# Patient Record
Sex: FEMALE | Race: BLACK OR AFRICAN AMERICAN | Employment: OTHER | URBAN - METROPOLITAN AREA
[De-identification: names, ages, dates, MRNs, and addresses within clinical notes are randomized per-mention and may not be internally consistent; named-entity substitution may affect disease eponyms.]

---

## 2017-12-13 ENCOUNTER — HOSPITAL ENCOUNTER (OUTPATIENT)
Dept: GENERAL RADIOLOGY | Age: 67
Discharge: HOME | End: 2017-12-13
Payer: MEDICARE

## 2017-12-13 DIAGNOSIS — M06.09 RHEUMATOID ARTHRITIS OF MULTIPLE SITES WITH NEGATIVE RHEUMATOID FACTOR (HCC): ICD-10-CM

## 2017-12-13 PROCEDURE — 73120 X-RAY EXAM OF HAND: CPT

## 2018-02-02 ENCOUNTER — APPOINTMENT (OUTPATIENT)
Dept: PHYSICAL THERAPY | Age: 68
End: 2018-02-02

## 2018-02-06 ENCOUNTER — APPOINTMENT (OUTPATIENT)
Dept: PHYSICAL THERAPY | Age: 68
End: 2018-02-06

## 2018-08-22 ENCOUNTER — HOSPITAL ENCOUNTER (OUTPATIENT)
Age: 68
Discharge: HOME | End: 2018-08-22
Attending: INTERNAL MEDICINE
Payer: MEDICARE

## 2018-08-22 DIAGNOSIS — M25.512 LEFT SHOULDER PAIN: ICD-10-CM

## 2018-08-22 PROCEDURE — 73221 MRI JOINT UPR EXTREM W/O DYE: CPT

## 2018-11-08 ENCOUNTER — OFFICE VISIT (OUTPATIENT)
Dept: ORTHOPEDIC SURGERY | Age: 68
End: 2018-11-08

## 2018-11-08 VITALS
DIASTOLIC BLOOD PRESSURE: 75 MMHG | RESPIRATION RATE: 16 BRPM | TEMPERATURE: 97.4 F | HEART RATE: 73 BPM | OXYGEN SATURATION: 99 % | WEIGHT: 266 LBS | SYSTOLIC BLOOD PRESSURE: 136 MMHG | BODY MASS INDEX: 41.75 KG/M2 | HEIGHT: 67 IN

## 2018-11-08 DIAGNOSIS — M75.112 INCOMPLETE TEAR OF LEFT ROTATOR CUFF: Primary | ICD-10-CM

## 2018-11-08 DIAGNOSIS — R22.30 SHOULDER MASS: ICD-10-CM

## 2018-11-08 PROBLEM — E66.01 OBESITY, MORBID (HCC): Status: ACTIVE | Noted: 2018-11-08

## 2018-11-08 RX ORDER — TRIAMCINOLONE ACETONIDE 40 MG/ML
40 INJECTION, SUSPENSION INTRA-ARTICULAR; INTRAMUSCULAR ONCE
Qty: 1 ML | Refills: 0
Start: 2018-11-08 | End: 2018-11-08

## 2018-11-08 NOTE — PROGRESS NOTES
Arron Cortez 1950 Chief Complaint Patient presents with  Shoulder Pain  
  left shoulder pain; mostly when laying down HISTORY OF PRESENT ILLNESS Arron Cortez is a 70 y.o. female who presents today for evaluation of left shoulder pain. She rates her pain 7/10 today. Pain has been present for a few months. The patient noticed a knot in the shoulder, which causes pain at night. Patient describes the pain as aching that is Constant in nature. Symptoms are worse with reaching overhead, Activity and is better with  Heat. Associated symptoms include weakness. Since problem started, it: is unchanged. Pain does wake patient up at night. Has taken no meds for the problem. Has tried following treatments: Injections:NO; Brace:NO; Therapy:NO; Cane/Crutch:NO Allergies Allergen Reactions  Penicillins Hives  Sulfur Hives Past Medical History:  
Diagnosis Date  CAD (coronary artery disease) 2003 Cabg x2  Diabetes ([facility] [address]  GERD (gastroesophageal reflux disease)  H/O seasonal allergies  Hypercholesteremia  Hypertension  Positive PPD, treated   
 cleared by physician 2.5 years ago.  [facility] Social History Socioeconomic History  Marital status: UNKNOWN Spouse name: Not on file  Number of children: Not on file  Years of education: Not on file  Highest education level: Not on file Social Needs  Financial resource strain: Not on file  Food insecurity - worry: Not on file  Food insecurity - inability: Not on file  Transportation needs - medical: Not on file  Transportation needs - non-medical: Not on file Occupational History  Not on file Tobacco Use  Smoking status: Never Smoker  Smokeless tobacco: Never Used Substance and Sexual Activity  Alcohol use: No  
 Drug use: No  
 Sexual activity: Not on file Other Topics Concern  Not on file Social History Narrative  Not on file Past Surgical History:  
Procedure Laterality Date  ABDOMEN SURGERY PROC UNLISTED  11/2014 Gastric Sleeve  HX BREAST LUMPECTOMY Right 6/2014  
 benign  HX CORONARY ARTERY BYPASS GRAFT  2003  
 cabg x2  HX CYST REMOVAL   age 21?  
 right  HX DILATION AND CURETTAGE    
 x2  
 HX TUBAL LIGATION No family history on file. Current Outpatient Medications Medication Sig  
 triamcinolone acetonide (KENALOG) 40 mg/mL injection 1 mL by IntraMUSCular route once for 1 dose.  atenolol (TENORMIN) 100 mg tablet Take 100 mg by mouth daily.  valsartan-hydrochlorothiazide (DIOVAN-HCT) 320-12.5 mg per tablet Take 1 Tab by mouth daily.  rosuvastatin (CRESTOR) 20 mg tablet Take 20 mg by mouth nightly.  insulin lispro protamine/insulin lispro (HUMALOG MIX 75/25) 100 unit/mL (75-25) injection by SubCUTAneous route.  ibuprofen (MOTRIN) 800 mg tablet Take 800 mg by mouth every six (6) hours as needed for Pain.  leflunomide (ARAVA) 20 mg tablet Take 20 mg by mouth daily.  aspirin 81 mg tablet Take 81 mg by mouth. No current facility-administered medications for this visit. REVIEW OF SYSTEM Patient denies: Weight loss, Fever/Chills, HA, Visual changes, Fatigue, Chest pain, SOB, Abdominal pain, N/V/D/C, Blood in stool or urine, Edema. Pertinent positive as above in HPI. All others were negative PHYSICAL EXAM:  
Visit Vitals /75 Pulse 73 Temp 97.4 °F (36.3 °C) (Oral) Resp 16 Ht 5' 7\" (1.702 m) Wt 266 lb (120.7 kg) LMP 09/15/2013 SpO2 99% BMI 41.66 kg/m² The patient is a well-developed, well-nourished female   in no acute distress. The patient is alert and oriented times three. The patient is alert and oriented times three. Mood and affect are normal. 
LYMPHATIC: lymph nodes are not enlarged and are within normal limits SKIN: normal in color and non tender to palpation. There are no bruises or abrasions noted. NEUROLOGICAL: Motor sensory exam is within normal limits. Reflexes are equal bilaterally. There is normal sensation to pinprick and light touch MUSCULOSKELETAL: 
Examination Left shoulder Skin Intact AC joint tenderness - Biceps tenderness - Forward flexion/Elevation  Active abduction  Glenohumeral abduction 90 External rotation ROM 45 Internal rotation ROM 30 Apprehension -  
Gagandeeps Relocation -  
Jerk - Load and Shift -  
Ancelmo Just - Speeds - Impingement sign + Supraspinatus/Empty Can -, 5/5 External Rotation Strength -, 5/5 Lift Off/Belly Press -, 5/5 Neurovascular Intact 3 by 3 cm mass over proximal arm, non tender to palpation PROCEDURE: Left Shoulder Injection with Ultrasound Guidance Indication:Left Shoulder pain/swelling After sterile prep, 6 cc of Xylocaine and 1 cc of Kenalog were injected into the left shoulder. Ultrasound images captured using Pramana1 [employer] Ultrasound machine and scanned into patient's chart. [facility] PROCEDURE PROGRESS NOTE Chart reviewed for the following: 
Diana Van M.D, have reviewed the History, Physical and updated the Allergic reactions for [facility] TIME OUT performed immediately prior to start of procedure: 
I, Heriberto Bill M.D, have performed the following reviews on [facility] prior to the start of the procedure: 
         
* Patient was identified by name and date of birth * Agreement on procedure being performed was verified * Risks and Benefits explained to the patient * Procedure site verified and marked as necessary * Patient was positioned for comfort * Consent was signed and verified Time: 10:32 AM  
 
Date of procedure: 11/8/2018 Procedure performed by:  Heriberto Bill M.D Provider assisted by: (see medication administration) How tolerated by patient: tolerated the procedure well with no complications Comments: none IMAGING: MRI of left shoulder dated 8/22/18 was reviewed and read: IMPRESSION: 
1. Supraspinatus and infraspinatus tendinopathy with partial-thickness tears as detailed above including a near full-thickness perforation at the supraspinatus insertion. 
-Mild acromioclavicular degenerative change with type II acromion. Small amount of subacromial subdeltoid bursal fluid. 2. Subscapularis insertion tendinopathy without definite tear. 
-Intra-articular biceps tendinopathy. 3. Suspected mild degenerative blunting of the posterior labrum. 
-Minimal edema in the rotator interval with fat signal largely preserved, 
nonspecific. Early adhesive capsulitis may be considered in the appropriate setting. IMPRESSION:   
  ICD-10-CM ICD-9-CM 1. Incomplete tear of left rotator cuff M75.112 840.4 TRIAMCINOLONE ACETONIDE INJ  
   triamcinolone acetonide (KENALOG) 40 mg/mL injection US GUIDE INJ/ASP/ARTHRO LG JNT/BURSA 2. Shoulder mass R22.30 719.61 PLAN: 
1. The patient presents today with left shoulder pain and I would like to see what impact a cortisone injection will have on the pain. Risk factors include: htn 2. No ultrasound exam indicated today 3. Yes cortisone injection indicated today L SHOULDER US 
4. No Physical/Occupational Therapy indicated today 5. No diagnostic test indicated today: 6. No durable medical equipment indicated today 7. No referral indicated today 8. No medications indicated today: 9. No Narcotic indicated today RTC 3 weeks Follow-up Disposition: Not on File Scribed by Nery Meyer ([address]) as dictated by Fam Fernando MD 
 
I, Dr. Fam Fernando, confirm that all documentation is accurate.  
 
Fam Fernando M.D.  
[facility]

## 2018-12-04 ENCOUNTER — OFFICE VISIT (OUTPATIENT)
Dept: ORTHOPEDIC SURGERY | Age: 68
End: 2018-12-04

## 2018-12-04 VITALS
SYSTOLIC BLOOD PRESSURE: 161 MMHG | HEIGHT: 67 IN | OXYGEN SATURATION: 99 % | HEART RATE: 80 BPM | WEIGHT: 264.2 LBS | RESPIRATION RATE: 16 BRPM | DIASTOLIC BLOOD PRESSURE: 78 MMHG | BODY MASS INDEX: 41.47 KG/M2 | TEMPERATURE: 97 F

## 2018-12-04 DIAGNOSIS — M75.112 INCOMPLETE TEAR OF LEFT ROTATOR CUFF: Primary | ICD-10-CM

## 2018-12-04 DIAGNOSIS — R22.30 SHOULDER MASS: ICD-10-CM

## 2018-12-04 NOTE — PROGRESS NOTES
Eduard Rios 1950 Chief Complaint Patient presents with  Shoulder Pain LEFT SHOULDER PAIN  
  
 
HISTORY OF PRESENT ILLNESS Eduard Rios is a 70 y.o. female who presents today for reevaluation of left shoulder pain. Patient rates pain as 0/10 today. At last OV, patient had a cortisone injection which provided good relief. She can  her arm without pain. She notes her glucose levels zhane after the injection, which she was not happy with. Pain has been present for a few months. The patient noticed a knot in the shoulder, which causes pain at night. Patient denies any fever, chills, chest pain, shortness of breath or calf pain. The remainder of the review of systems is negative. There are no new illness or injuries to report since last seen in the office. There are no changes to medications, allergies, family or social history. PHYSICAL EXAM:  
Visit Vitals /78 Pulse 80 Temp 97 °F (36.1 °C) (Oral) Resp 16 Ht 5' 7\" (1.702 m) Wt 264 lb 3.2 oz (119.8 kg) LMP 09/15/2013 SpO2 99% BMI 41.38 kg/m² The patient is a well-developed, well-nourished female   in no acute distress. The patient is alert and oriented times three. The patient is alert and oriented times three. Mood and affect are normal. 
LYMPHATIC: lymph nodes are not enlarged and are within normal limits SKIN: normal in color and non tender to palpation. There are no bruises or abrasions noted. NEUROLOGICAL: Motor sensory exam is within normal limits. Reflexes are equal bilaterally. There is normal sensation to pinprick and light touch MUSCULOSKELETAL: 
Examination Left shoulder Skin Intact AC joint tenderness - Biceps tenderness - Forward flexion/Elevation  Active abduction  Glenohumeral abduction 90 External rotation ROM 45 Internal rotation ROM 30 Apprehension -  
Gagandeeps Relocation -  
Jerk - Load and Shift -  
Jaelyn Leyva - Clarissa - Impingement sign + Supraspinatus/Empty Can -, 5/5 External Rotation Strength -, 5/5 Lift Off/Belly Press -, 5/5 Neurovascular Intact 3 by 3 cm mass over proximal arm, non tender to palpation IMAGING: MRI of left shoulder dated 8/22/18 was reviewed and read: IMPRESSION: 
1. Supraspinatus and infraspinatus tendinopathy with partial-thickness tears as detailed above including a near full-thickness perforation at the supraspinatus insertion. 
-Mild acromioclavicular degenerative change with type II acromion. Small amount of subacromial subdeltoid bursal fluid. 2. Subscapularis insertion tendinopathy without definite tear. 
-Intra-articular biceps tendinopathy. 3. Suspected mild degenerative blunting of the posterior labrum. 
-Minimal edema in the rotator interval with fat signal largely preserved, 
nonspecific. Early adhesive capsulitis may be considered in the appropriate setting. 
  
IMPRESSION:   
  ICD-10-CM ICD-9-CM 1. Incomplete tear of left rotator cuff M75.112 840.4 2. Shoulder mass R22.30 719.61 PLAN:  
1. The patient presents today with left shoulder pain due to a partial rotator cuff tear which was helped greatly by the cortisone injection. Risk factors include: htn 2. No ultrasound exam indicated today 3. No cortisone injection indicated today 4. No Physical/Occupational Therapy indicated today 5. No diagnostic test indicated today: 6. No durable medical equipment indicated today 7. No referral indicated today 8. No medications indicated today: 9. No Narcotic indicated today RTC prn Follow-up Disposition: Not on File Scribed by Ilene Dey ([place]nes) as dictated by Lorna Mcclendon MD 
 
I, Dr. Lorna Mcclendon, confirm that all documentation is accurate.  
 
Lorna Mcclendon M.D.  
[facility]

## 2019-02-27 ENCOUNTER — HOSPITAL ENCOUNTER (OUTPATIENT)
Dept: GENERAL RADIOLOGY | Age: 69
Discharge: HOME | End: 2019-02-27
Admitting: INTERNAL MEDICINE
Payer: MEDICARE

## 2019-02-27 DIAGNOSIS — R05.9 COUGH: ICD-10-CM

## 2019-02-27 PROCEDURE — 71046 X-RAY EXAM CHEST 2 VIEWS: CPT

## 2021-05-06 ENCOUNTER — OFFICE VISIT (OUTPATIENT)
Dept: ORTHOPEDIC SURGERY | Age: 71
End: 2021-05-06
Payer: MEDICARE

## 2021-05-06 VITALS
BODY MASS INDEX: 43 KG/M2 | HEIGHT: 67 IN | WEIGHT: 274 LBS | HEART RATE: 66 BPM | TEMPERATURE: 98.1 F | OXYGEN SATURATION: 99 %

## 2021-05-06 DIAGNOSIS — M54.50 CHRONIC LOW BACK PAIN, UNSPECIFIED BACK PAIN LATERALITY, UNSPECIFIED WHETHER SCIATICA PRESENT: ICD-10-CM

## 2021-05-06 DIAGNOSIS — G89.29 CHRONIC LOW BACK PAIN, UNSPECIFIED BACK PAIN LATERALITY, UNSPECIFIED WHETHER SCIATICA PRESENT: ICD-10-CM

## 2021-05-06 DIAGNOSIS — M16.11 PRIMARY OSTEOARTHRITIS OF RIGHT HIP: ICD-10-CM

## 2021-05-06 DIAGNOSIS — M43.19 SPONDYLOLISTHESIS OF MULTIPLE SITES IN SPINE: Primary | ICD-10-CM

## 2021-05-06 DIAGNOSIS — M43.19 SPONDYLOLISTHESIS OF MULTIPLE SITES IN SPINE: ICD-10-CM

## 2021-05-06 PROCEDURE — 99204 OFFICE O/P NEW MOD 45 MIN: CPT | Performed by: PHYSICAL MEDICINE & REHABILITATION

## 2021-05-06 PROCEDURE — G8400 PT W/DXA NO RESULTS DOC: HCPCS | Performed by: PHYSICAL MEDICINE & REHABILITATION

## 2021-05-06 PROCEDURE — G8427 DOCREV CUR MEDS BY ELIG CLIN: HCPCS | Performed by: PHYSICAL MEDICINE & REHABILITATION

## 2021-05-06 PROCEDURE — 1101F PT FALLS ASSESS-DOCD LE1/YR: CPT | Performed by: PHYSICAL MEDICINE & REHABILITATION

## 2021-05-06 PROCEDURE — 1090F PRES/ABSN URINE INCON ASSESS: CPT | Performed by: PHYSICAL MEDICINE & REHABILITATION

## 2021-05-06 PROCEDURE — G8536 NO DOC ELDER MAL SCRN: HCPCS | Performed by: PHYSICAL MEDICINE & REHABILITATION

## 2021-05-06 PROCEDURE — 72110 X-RAY EXAM L-2 SPINE 4/>VWS: CPT | Performed by: PHYSICAL MEDICINE & REHABILITATION

## 2021-05-06 PROCEDURE — G8510 SCR DEP NEG, NO PLAN REQD: HCPCS | Performed by: PHYSICAL MEDICINE & REHABILITATION

## 2021-05-06 PROCEDURE — G8417 CALC BMI ABV UP PARAM F/U: HCPCS | Performed by: PHYSICAL MEDICINE & REHABILITATION

## 2021-05-06 PROCEDURE — 3017F COLORECTAL CA SCREEN DOC REV: CPT | Performed by: PHYSICAL MEDICINE & REHABILITATION

## 2021-05-06 RX ORDER — DULOXETIN HYDROCHLORIDE 60 MG/1
60 CAPSULE, DELAYED RELEASE ORAL
Qty: 30 CAP | Refills: 1 | Status: SHIPPED | OUTPATIENT
Start: 2021-05-06 | End: 2021-06-14 | Stop reason: SDUPTHER

## 2021-05-06 RX ORDER — FLUTICASONE PROPIONATE 50 MCG
2 SPRAY, SUSPENSION (ML) NASAL
COMMUNITY
Start: 2020-10-14

## 2021-05-06 RX ORDER — LOSARTAN POTASSIUM AND HYDROCHLOROTHIAZIDE 12.5; 1 MG/1; MG/1
1 TABLET ORAL DAILY
COMMUNITY
End: 2021-05-06

## 2021-05-06 RX ORDER — HYDROXYCHLOROQUINE SULFATE 200 MG/1
200 TABLET, FILM COATED ORAL 2 TIMES DAILY
COMMUNITY

## 2021-05-06 RX ORDER — NAPROXEN SODIUM 220 MG
TABLET ORAL
COMMUNITY

## 2021-05-06 RX ORDER — PEN NEEDLE, DIABETIC 31 GX3/16"
NEEDLE, DISPOSABLE MISCELLANEOUS
COMMUNITY
Start: 2021-02-17

## 2021-05-06 RX ORDER — METAXALONE 800 MG/1
800 TABLET ORAL
COMMUNITY
Start: 2021-04-16

## 2021-05-06 RX ORDER — DULOXETIN HYDROCHLORIDE 30 MG/1
CAPSULE, DELAYED RELEASE ORAL
COMMUNITY
Start: 2021-02-08 | End: 2021-05-06 | Stop reason: SDUPTHER

## 2021-05-06 RX ORDER — METOPROLOL SUCCINATE 50 MG/1
TABLET, EXTENDED RELEASE ORAL
COMMUNITY
Start: 2021-02-08

## 2021-05-06 RX ORDER — ALBUTEROL SULFATE 90 UG/1
2 AEROSOL, METERED RESPIRATORY (INHALATION)
COMMUNITY
Start: 2020-06-15

## 2021-05-06 RX ORDER — PEN NEEDLE, DIABETIC 31 GX5/16"
NEEDLE, DISPOSABLE MISCELLANEOUS
COMMUNITY
Start: 2021-02-18

## 2021-05-06 NOTE — PROGRESS NOTES
Verbal order entered per Dr. Kendy Galvez as documented on blue sheet: MRI lumbar spine-4 months low back pain, night pain, spondylolisthesis

## 2021-05-06 NOTE — PROGRESS NOTES
[address] 
[address], [address] [place], [address] [place] [address] Street Phone: [phone] Fax: [phone] Terry Burrell : 1950 PCP: Daja Stewart MD 
 
 
NEW PATIENT 
 
 
ASSESSMENT AND PLAN Diagnoses and all orders for this visit: 1. Spondylolisthesis of multiple sites in spine 
-     DULoxetine (CYMBALTA) 60 mg capsule; Take 1 Cap by mouth daily (with dinner). 2. Chronic low back pain, unspecified back pain laterality, unspecified whether sciatica present -     AMB POC XRAY, SPINE, LUMBOSACRAL; 4+ 1. Perry Hodgkins is a 69 y.o. female ***. 2. Advised to stay active as tolerated. 3. Recommended f/u with Dr. Carolee Stone for L hip pain 4. Increased Cymbalta to 60 mg qdinner 5. L MRI: *** 
6. Given information on MRI Follow-up and Dispositions · Return for MRI/CT f/u. CHIEF COMPLAINT Perry Hodgkins is seen today in consultation at the request of Dr. Yordan Hu for complaints of back pain HISTORY OF PRESENT ILLNESS Perry Hodgkins is a 69 y.o. female. Today pt c/o back pain of 20 years duration. Pt denies any specific incident or injury that caused their pain Pt states that she used to have intermittent back pain. However, she affirms daily back pain for the last 4 months. Pt reports that her biggest pain is when she tries to lay down. Describes as a soreness to the bone in the LLE. Denies feeling numbness or heaviness in the BLE. She affirms increased low back pain with walking. Positive shopping cart. Pt also states that she has difficulty climbing in and out of cars. Pain Assessment  2021 Location of Pain Back Location Modifiers - Severity of Pain 8 Quality of Pain Throbbing Duration of Pain Persistent Frequency of Pain Constant Aggravating Factors Walking; Other (Comment) Aggravating Factors Comment laying down , standing up Limiting Behavior Yes Relieving Factors Other (Comment) Relieving Factors Comment hot shower sometimes Result of Injury No  
 
 
Onset of pain: ~2001, worse since 1/2021 Does pain radiate into extremities: L hip Does patient have numbness/tingling: no 
Does patient have weakness: no  
Denies red flags including: persistent fevers, chills, weight changes, saddle paresthesias, and neurogenic bowel or bladder symptoms. Treatments patient has tried: 
Physical therapy:No 
Doing HEP: No 
Beneficial medications: Ibuprofen 800 mg. Cymbalta 30 mg. Failed medications: Skelaxin Spinal injections: No 
 
Spinal surgery- No.  
Spine surgery consult: No.  
 
L XR AP/lat/flex/ext 4V 5/6/2021 (I personally reviewed these images): lumbar listhesis L4-5-S1, mild motion***  reviewed. PMHx of GERD, DM, HLD, CAD, RA. 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 Raising great grandson. PAST MEDICAL HISTORY Past Medical History:  
Diagnosis Date  CAD (coronary artery disease) 2003 Cabg x2  Diabetes ([address]  GERD (gastroesophageal reflux disease)  H/O seasonal allergies  Hypercholesteremia  Hypertension  Positive PPD, treated   
 cleared by physician 2.5 years ago.  Rheumatoid arthritis ([facility] [address] Past Surgical History:  
Procedure Laterality Date  COLONOSCOPY N/A 7/12/2016 COLONOSCOPY performed by Darren Pinto MD at [facility] ENDOSCOPY  
 HX BREAST LUMPECTOMY Right 6/2014  
 benign  HX CORONARY ARTERY BYPASS GRAFT  2003  
 cabg x2  HX CYST REMOVAL   age 21?  
 right  HX DILATION AND CURETTAGE    
 x2  
 HX TUBAL LIGATION    
 OR ABDOMEN SURGERY PROC UNLISTED  11/2014 Gastric Sleeve MEDICATIONS Current Outpatient Medications Medication Sig Dispense Refill  albuterol (PROVENTIL HFA, VENTOLIN HFA, PROAIR HFA) 90 mcg/actuation inhaler Take 2 Puffs by inhalation every six (6) hours as needed.     
 fluticasone propionate (FLONASE) 50 mcg/actuation nasal spray 1 Spray two (2) times a day.    
 hydrOXYchloroQUINE (PLAQUENIL) 200 mg tablet Take 200 mg by mouth two (2) times a day.  metaxalone (SKELAXIN) 800 mg tablet take 1/2 to 1 tablet up to three times a day for 90 DAYS  metoprolol succinate (TOPROL-XL) 50 mg XL tablet take 1 tablet by mouth once daily  BD Ultra-Fine Short Pen Needle 31 gauge x 5/16\" ndle use 1 PEN NEEDLE to inject MEDICATION subcutaneously twice a day  Insulin Needles, Disposable, 31 gauge x 3/16\" ndle USE 1 PEN NEEDLE TWICE A DAY TO INJECT INSULIN AS DIRECTED  Insulin Syringe-Needle U-100 0.5 mL 31 gauge x 5/16\" syrg by Misc. (Non-Drug; Combo Route) route.  DULoxetine (CYMBALTA) 60 mg capsule Take 1 Cap by mouth daily (with dinner). 30 Cap 1  valsartan-hydrochlorothiazide (DIOVAN-HCT) 320-12.5 mg per tablet Take 1 Tab by mouth daily.  rosuvastatin (CRESTOR) 20 mg tablet Take 20 mg by mouth nightly.  insulin lispro protamine/insulin lispro (HUMALOG MIX 75/25) 100 unit/mL (75-25) injection by SubCUTAneous route. 30 units in the morning  ibuprofen (MOTRIN) 800 mg tablet Take 800 mg by mouth every six (6) hours as needed for Pain.  aspirin 81 mg tablet Take 81 mg by mouth daily. Controlled Substance Monitoring: No flowsheet data found. ALLERGIES Allergies Allergen Reactions  Penicillins Hives  Sulfur Hives PHYSICAL EXAMINATION Visit Vitals Pulse 66 Temp 98.1 °F (36.7 °C) (Oral) Ht 5' 7\" (1.702 m) Wt 274 lb (124.3 kg) LMP 09/15/2013 SpO2 99% Comment: RA  
BMI 42.91 kg/m² SPINE/MUSCULOSKELETAL EXAM 
 
Unable to stand at neutral.  
 
 
Written by [employer] Lloyd, as dictated by Trey Gaytan MD. 
 
I, Dr. Trey Gaytan MD, confirm that all documentation is accurate. Ms. Yaya Rajput may have a reminder for a \"due or due soon\" health maintenance. I have asked that she contact her primary care provider for follow-up on this health maintenance.

## 2021-05-06 NOTE — PROGRESS NOTES
[address] [address]  [address], [address]  [place] [address]  Phone: [phone]  Fax: [phone]        Lesia Salgado  : 1950  PCP: Tunde Jimenez MD    PROGRESS NOTE      ASSESSMENT AND PLAN    Diagnoses and all orders for this visit:    1. Spondylolisthesis of multiple sites in spine  -     AMB POC XRAY, SPINE, LUMBOSACRAL; 4+  -     DULoxetine (CYMBALTA) 60 mg capsule; Take 1 Cap by mouth daily (with dinner). -     MRI LUMB SPINE WO CONT; Future         1. Ms. Sher Cornelius is a 39-year-old lady with progressive nocturnal back pain and symptoms consistent with neurogenic claudication. She has a history of of rheumatoid arthritis which is under control. She is having difficulty sleeping despite her medications and had no benefit with muscle relaxers. She has been taking duloxetine 30 mg times several months by Dr. Yajaira Bejarano instead of Silvia Dorsey. She denies any benefit for her joints. I am going to change this to 60 mg but at dinnertime. .  Given her listhesis, I think she needs an MRI for further evaluation of stenosis. 2. She is going to follow-up with Dr. Sonia Taylor for her hip pain. She has seen him in the past    Follow-up and Dispositions    · Return for MRI/CT f/u. HISTORY OF PRESENT ILLNESS  Brea Tapia is a 69 y.o. female. Pt presents to the office as a new pt for low back pain. She is referred from Dr. Yajaira Bejarano. Notes reviewed, she has a history of rheumatoid arthritis and recently had her meds adjusted due to elevated LFTs from Silvia Dorsey. She also has a history of reflux and diabetes and is therefore challenging as far as medication options. Ms. Sher Cornelius is reporting back pain for 20 years, its been intermittent. About 4 months ago she started to notice daily pain particularly at nighttime. She has not slept well in months. She denies any radiating pain but does have a lot of left hip pain.   No fevers chills bowel or bladder changes. On physical exam pleasant middle-aged lady in no acute distress. She is tender at the sacrum. Strength of her lower extremities is intact. She does have groin pain with left hip range of motion and a positive roll test.  Straight leg raise is negative. Pain Assessment  5/6/2021   Location of Pain Back   Location Modifiers -   Severity of Pain 8   Quality of Pain Throbbing   Duration of Pain Persistent   Frequency of Pain Constant   Aggravating Factors Walking; Other (Comment)   Aggravating Factors Comment laying down , standing up   Limiting Behavior Yes   Relieving Factors Other (Comment)   Relieving Factors Comment hot shower sometimes   Result of Injury No         Onset of pain: Chronic, worse since 12/ 2020, no injury  Does pain radiate into extremities: No  Does patient have numbness/tingling: No  Does patient have weakness: No  Denies red flags including: persistent fevers, chills, weight changes, saddle paresthesias, and neurogenic bowel or bladder symptoms. Treatments patient has tried:  Physical therapy:No  Doing HEP: Unknown  Beneficial medications: Ibu  Failed medications: Skelaxin  Spinal injections: No    Spinal surgery- No.   Spine surgery consult: Unknown. 4 views of the lumbar spine demonstrate a spondylolisthesis L4-L5 and L5-S1 grade 1 with slight motion on flexion  5/6/2021 (I personally reviewed these images):   C x-ray: 2013: DDD C5/C6   L MRI 2012: Transitional lumbosacral anatomy, listhesis at L4-L5 with facet arthropathy. Pertinent PMHx of CAD status post CABG, diabetes, reflux, HTN, rheumatoid arthritiss (Dr. Tiffanie Daughetry)    Visit Vitals  Pulse 66   Temp 98.1 °F (36.7 °C) (Oral)   Ht 5' 7\" (1.702 m)   Wt 274 lb (124.3 kg)   LMP 09/15/2013   SpO2 99% Comment: RA   BMI 42.91 kg/m²       Ms. Sonali Obrien may have a reminder for a \"due or due soon\" health maintenance. I have asked that she contact her primary care provider for follow-up on this health maintenance.  This note was created using Dragon transcription software and may contain unintended errors.

## 2021-05-06 NOTE — PROGRESS NOTES
Carlos Valderrama presents today for   Chief Complaint   Patient presents with    Back Pain       Is someone accompanying this pt? no    Is the patient using any DME equipment during OV? no    Depression Screening:  3 most recent PHQ Screens 5/6/2021   Little interest or pleasure in doing things Not at all   Feeling down, depressed, irritable, or hopeless Not at all   Total Score PHQ 2 0       Abuse Screening:  Abuse Screening Questionnaire 5/6/2021   Do you ever feel afraid of your partner? N   Are you in a relationship with someone who physically or mentally threatens you? N   Is it safe for you to go home? Y       Fall Risk  Fall Risk Assessment, last 12 mths 5/6/2021   Able to walk? Yes   Fall in past 12 months? 0   Do you feel unsteady? 0   Are you worried about falling 0       Coordination of Care:  1. Have you been to the ER, urgent care clinic since your last visit? no  Hospitalized since your last visit? no    2. Have you seen or consulted any other health care providers outside of the [address] [place] [address] since your last visit? Yes, rheumatology Include any pap smears or colon screening.  no

## 2021-05-06 NOTE — PATIENT INSTRUCTIONS
Magnetic Resonance Imaging (MRI): About This Test 
What is it? Magnetic resonance imaging (MRI) is a test that uses a magnetic field and pulses of radio wave energy to make pictures of organs and structures inside the body. When you have an MRI, you lie on a table and your body is moved into the MRI machine, where an image is taken of the area of the body being studied. Why is this test done? There are many reasons to have an MRI test. This test can find problems such as tumors, bleeding, injury, conditions that some children are born with, and infection. An MRI also may provide more information about a problem seen on an X-ray, ultrasound scan, CT scan, or nuclear medicine exam. 
How can you prepare for the test? 
For some MRI pictures of the belly, you may be asked to not eat or drink for several hours before the test. 
Tell your doctor if you get nervous in tight spaces. You may get a medicine to help you relax. If you think you'll get this medicine, be sure to arrange a ride home. It may be unsafe for you to drive or get home on your own. How is the test done? · You may have contrast materials (dye) put into your arm through a tube called an IV. · You will lie on a table that is part of the MRI scanner. · The table will slide into the space that contains the magnet. · Inside the scanner you will hear a fan and feel air moving. You may hear tapping, thumping, or snapping noises. You may be given earplugs or headphones to reduce the noise. · You will be asked to hold still during the scan. You may be asked to hold your breath for short periods. · You may be alone in the scanning room. But a technologist will watch you through a window and talk with you during the test. 
What are the risks of the test? 
· Contrast material that contains gadolinium may be used in this test. But for most people, the benefit of its use in this test outweighs the risk.  Be sure to tell your doctor if you have kidney problems or are pregnant. · If you breastfeed and are concerned about whether the dye used in this test is safe, talk to your doctor. Most experts believe that very little dye passes into breast milk and even less is passed on to the baby. But if you are concerned, you can stop breastfeeding for up to 24 hours after the test. During this time, you can give your baby breast milk that you stored before the test. Don't use the breast milk you pump in the 24 hours after the test. Throw it out. How long does the test take? The test usually takes 30 to 60 minutes. But it can take as long as 2 hours. What happens after the test? 
You will probably be able to go home right away, depending on the reason for the test. 
Follow-up care is a key part of your treatment and safety. Be sure to make and go to all appointments, and call your doctor if you are having problems. It's also a good idea to keep a list of the medicines you take. Ask your doctor when you can expect to have your test results. Where can you learn more? Go to http://www.gray.com/ Enter V016 in the search box to learn more about \"Magnetic Resonance Imaging (MRI): About This Test.\" Current as of: September 23, 2020               Content Version: 12.8 © 2006-2021 [employer]. Care instructions adapted under license by [employer] (which disclaims liability or warranty for this information). If you have questions about a medical condition or this instruction, always ask your healthcare professional. [place] [address] any warranty or liability for your use of this information.

## 2021-05-26 ENCOUNTER — HOSPITAL ENCOUNTER (OUTPATIENT)
Age: 71
Discharge: HOME | End: 2021-05-26
Attending: PHYSICAL MEDICINE & REHABILITATION
Payer: MEDICARE

## 2021-05-26 PROCEDURE — 72148 MRI LUMBAR SPINE W/O DYE: CPT

## 2021-06-14 ENCOUNTER — OFFICE VISIT (OUTPATIENT)
Dept: ORTHOPEDIC SURGERY | Age: 71
End: 2021-06-14
Payer: MEDICARE

## 2021-06-14 VITALS
HEIGHT: 67 IN | HEART RATE: 59 BPM | BODY MASS INDEX: 41.91 KG/M2 | OXYGEN SATURATION: 99 % | WEIGHT: 267 LBS | TEMPERATURE: 97.7 F

## 2021-06-14 DIAGNOSIS — M47.816 LUMBAR FACET ARTHROPATHY: ICD-10-CM

## 2021-06-14 DIAGNOSIS — M16.0 PRIMARY OSTEOARTHRITIS OF BOTH HIPS: ICD-10-CM

## 2021-06-14 DIAGNOSIS — M43.16 SPONDYLOLISTHESIS AT L4-L5 LEVEL: Primary | ICD-10-CM

## 2021-06-14 PROCEDURE — 1090F PRES/ABSN URINE INCON ASSESS: CPT | Performed by: PHYSICAL MEDICINE & REHABILITATION

## 2021-06-14 PROCEDURE — 99214 OFFICE O/P EST MOD 30 MIN: CPT | Performed by: PHYSICAL MEDICINE & REHABILITATION

## 2021-06-14 PROCEDURE — G8427 DOCREV CUR MEDS BY ELIG CLIN: HCPCS | Performed by: PHYSICAL MEDICINE & REHABILITATION

## 2021-06-14 PROCEDURE — G8432 DEP SCR NOT DOC, RNG: HCPCS | Performed by: PHYSICAL MEDICINE & REHABILITATION

## 2021-06-14 PROCEDURE — 3017F COLORECTAL CA SCREEN DOC REV: CPT | Performed by: PHYSICAL MEDICINE & REHABILITATION

## 2021-06-14 PROCEDURE — G8417 CALC BMI ABV UP PARAM F/U: HCPCS | Performed by: PHYSICAL MEDICINE & REHABILITATION

## 2021-06-14 PROCEDURE — G8536 NO DOC ELDER MAL SCRN: HCPCS | Performed by: PHYSICAL MEDICINE & REHABILITATION

## 2021-06-14 PROCEDURE — 1101F PT FALLS ASSESS-DOCD LE1/YR: CPT | Performed by: PHYSICAL MEDICINE & REHABILITATION

## 2021-06-14 PROCEDURE — G8400 PT W/DXA NO RESULTS DOC: HCPCS | Performed by: PHYSICAL MEDICINE & REHABILITATION

## 2021-06-14 RX ORDER — ATENOLOL 100 MG/1
100 TABLET ORAL DAILY
COMMUNITY

## 2021-06-14 RX ORDER — METHYLPREDNISOLONE 4 MG/1
TABLET ORAL
Qty: 1 DOSE PACK | Refills: 0 | Status: SHIPPED | OUTPATIENT
Start: 2021-06-14 | End: 2021-09-27 | Stop reason: SINTOL

## 2021-06-14 RX ORDER — LOSARTAN POTASSIUM AND HYDROCHLOROTHIAZIDE 12.5; 1 MG/1; MG/1
1 TABLET ORAL DAILY
COMMUNITY
End: 2021-09-27

## 2021-06-14 RX ORDER — DULOXETIN HYDROCHLORIDE 60 MG/1
60 CAPSULE, DELAYED RELEASE ORAL
Qty: 90 CAPSULE | Refills: 1 | Status: SHIPPED | OUTPATIENT
Start: 2021-06-14 | End: 2021-09-27 | Stop reason: SINTOL

## 2021-06-14 NOTE — PROGRESS NOTES
[address]  [address], [address] [place] [address]  Phone: [phone]  Fax: [phone]      Lesia Salgado  : 1950  PCP: Tunde Jimenez MD    PROGRESS NOTE    Diagnoses and all orders for this visit:    1. Spondylolisthesis at L4-L5 level  -     DULoxetine (CYMBALTA) 60 mg capsule; Take 1 Capsule by mouth daily (with dinner). -     methylPREDNISolone (MEDROL DOSEPACK) 4 mg tablet; Take as directed. Do not combine with any NSAIDS. 2. Lumbar facet arthropathy    3. Primary osteoarthritis of both hips         1. Ivy Dangelo is a 69 y.o. female with chronic low back pain, spondylolisthesis with significant facet synovitis at L4-L5. She remains neurologically intact. 2. Continue duloxetine, refilled x6 months. 3. No x-ray available in office today. Previously known moderate OA of her hips. 4. MDP for left hip bursitis. No NSAIDs  5. If she has recurrent left hip bursitis, follow-up Ortho. Follow-up and Dispositions    · Return in about 6 months (around 2021) for routine med f/u, spine re-evaluation. Chief complaint   Chief Complaint   Patient presents with   [address] [facility] [address] Back Pain         [address]  [address], [address]  Phone: [phone]  Fax: [phone]      Lesia Salgado  : 1950  PCP: Tunde Jimenez MD    PROGRESS NOTE    Diagnoses and all orders for this visit:    1. Spondylolisthesis at L4-L5 level  -     DULoxetine (CYMBALTA) 60 mg capsule; Take 1 Capsule by mouth daily (with dinner). -     methylPREDNISolone (MEDROL DOSEPACK) 4 mg tablet; Take as directed. Do not combine with any NSAIDS. 2. Lumbar facet arthropathy    3. Primary osteoarthritis of both hips       71yo w/RA, CABG, severe facet synovitis on MRI. Managing w/current dose of Cymbalta.       Follow-up and Dispositions    · Return in about 6 months (around 2021) for routine med f/u, spine re-evaluation. Chief complaint   Chief Complaint   Patient presents with    Back Pain     History of Present Illness:  Lavona Merlin is a  69 y.o.  female returns today for lumbar MRI review. She has significant facet arthropathy L3-L4 L4-L5 with synovitis. LV Cymbalta inc to 60mg. Reports benefit, no side effect. She denies any radiations into the lower extremities. She is having left groin pain difficulty getting out of cars. Can't sleep on L side due to pain. No cortisone/Prednisone in years. Physical Exam:  She has pain with left hip range of motion. Lower extremity motor intact. Straight leg raise negative. No peripheral edema. TTP L4-L5 bilaterally. Visit Vitals  Pulse (!) 59 Comment: pt asymptomatic, MD aware   Temp 97.7 °F (36.5 °C) (Tympanic)   Ht 5' 7\" (1.702 m)   Wt 267 lb (121.1 kg)   LMP 09/15/2013   SpO2 99% Comment: RA   BMI 41.82 kg/m²     Pain Scale: 8/10      We have informed Lavona Merlin to notify us for immediate appointment if she has any worsening neurogical symptoms or if an emergency situation presents, then call 911. Unintended errors may be present due to dragon medical dictation software. Visit Vitals  Pulse (!) 59 Comment: pt asymptomatic, MD aware   Temp 97.7 °F (36.5 °C) (Tympanic)   Ht 5' 7\" (1.702 m)   Wt 267 lb (121.1 kg)   LMP 09/15/2013   SpO2 99% Comment: RA   BMI 41.82 kg/m²     Pain Scale: 8/10      Pertinent past spine history:  L MRI 5/2021 grade I listhesis w/severe synovitis. L XR: listhesis I-II L4/5, listhesis I L3/4, motion noted  2012 xray : mod DJD b/l hips  Beneficial meds: Cymbalta 60mg, Ibuprofen, Prednisone  Failed meds:  Skelaxin  Spine injections: none  Spine sx: no      Pertinent past medical history:   CAD status post CABG, diabetes, reflux, HTN, rheumatoid arthritiss (Dr. Mirella Silver), Previously on chronic steroids w/weight gain and DM.  Fully vax COVID (spring 2021)    MRI Results (most recent):  Results from Orders Only encounter on 05/06/21    MRI LUMB SPINE WO CONT    Narrative  EXAM: MRI LUMB SPINE WO CONT    CLINICAL INDICATIONS/HISTORY: 4 months low back pain, night pain,  spondylolisthesis    COMPARISON: December 2012 MRI lumbar spine    Technique: Multi-sequence multiplanar T1, T2, STIR imaging with and without fat  saturation obtained centered on the lumbar spine. FINDINGS:    Alignment: Intact lordosis  Vertebral body height: Normal  Marrow signal: Unremarkable  Disc spaces: Preserved height and signal intensity  Conus: Terminates at T12-L1    Axial imaging correlation:        L1-2: Patent canal and foramina. L2-3: Mild facet arthropathy Patent canal and foramina. L3-4: Mild facet arthropathy canal right left neuroforamina unremarkable no  interval change    L4-5: Moderate facet arthropathy present central canal right and left  neuroforamina unremarkable, no significant interval change    L5-S1: There is a degenerative subluxation L5 on S1 approximately 0.5 cm with  moderate signal loss disc spaces not    Central canal right left neuroforamina unremarkable    Moderate to marked facet arthropathy and hypertrophic changes identified in the  facets bilaterally there is been slight progression in the amount of arthritis  since 2012    Other structures: There is a lateral left cortical cyst identified image 36  series 7 no features of malignancy  There is a small posterior cortical cyst right kidney appears benign image 33  series 7    Impression  There is degenerative subluxation L5 on S1 secondary to severe facet arthropathy  which has progressed slightly since 2012    Additionally facet arthropathy is identified from L2 through L5 bilaterally              We have informed [address] to notify us for immediate appointment if she has any worsening neurogical symptoms or if an emergency situation presents, then call 911.   Unintended errors may be present due to [employer] dictation software.

## 2021-09-27 ENCOUNTER — OFFICE VISIT (OUTPATIENT)
Dept: ORTHOPEDIC SURGERY | Age: 71
End: 2021-09-27
Payer: MEDICARE

## 2021-09-27 VITALS
WEIGHT: 283 LBS | HEART RATE: 63 BPM | HEIGHT: 67 IN | OXYGEN SATURATION: 100 % | TEMPERATURE: 97.1 F | BODY MASS INDEX: 44.42 KG/M2

## 2021-09-27 DIAGNOSIS — M16.0 PRIMARY OSTEOARTHRITIS OF BOTH HIPS: Primary | ICD-10-CM

## 2021-09-27 DIAGNOSIS — M43.16 SPONDYLOLISTHESIS AT L4-L5 LEVEL: ICD-10-CM

## 2021-09-27 PROCEDURE — 3017F COLORECTAL CA SCREEN DOC REV: CPT | Performed by: PHYSICAL MEDICINE & REHABILITATION

## 2021-09-27 PROCEDURE — 1101F PT FALLS ASSESS-DOCD LE1/YR: CPT | Performed by: PHYSICAL MEDICINE & REHABILITATION

## 2021-09-27 PROCEDURE — 1090F PRES/ABSN URINE INCON ASSESS: CPT | Performed by: PHYSICAL MEDICINE & REHABILITATION

## 2021-09-27 PROCEDURE — 99214 OFFICE O/P EST MOD 30 MIN: CPT | Performed by: PHYSICAL MEDICINE & REHABILITATION

## 2021-09-27 PROCEDURE — G8417 CALC BMI ABV UP PARAM F/U: HCPCS | Performed by: PHYSICAL MEDICINE & REHABILITATION

## 2021-09-27 PROCEDURE — G8400 PT W/DXA NO RESULTS DOC: HCPCS | Performed by: PHYSICAL MEDICINE & REHABILITATION

## 2021-09-27 PROCEDURE — G8427 DOCREV CUR MEDS BY ELIG CLIN: HCPCS | Performed by: PHYSICAL MEDICINE & REHABILITATION

## 2021-09-27 PROCEDURE — G8432 DEP SCR NOT DOC, RNG: HCPCS | Performed by: PHYSICAL MEDICINE & REHABILITATION

## 2021-09-27 PROCEDURE — G8536 NO DOC ELDER MAL SCRN: HCPCS | Performed by: PHYSICAL MEDICINE & REHABILITATION

## 2021-09-27 RX ORDER — FLASH GLUCOSE SCANNING READER
1 EACH MISCELLANEOUS
COMMUNITY
Start: 2021-08-19 | End: 2021-09-27

## 2021-09-27 RX ORDER — PROMETHAZINE HYDROCHLORIDE AND DEXTROMETHORPHAN HYDROBROMIDE 6.25; 15 MG/5ML; MG/5ML
2.5 SYRUP ORAL
COMMUNITY
Start: 2021-08-19

## 2021-09-27 RX ORDER — FLASH GLUCOSE SENSOR
1 KIT MISCELLANEOUS
COMMUNITY
Start: 2021-08-19 | End: 2021-09-27

## 2021-09-27 RX ORDER — VALSARTAN AND HYDROCHLOROTHIAZIDE 320; 12.5 MG/1; MG/1
1 TABLET, FILM COATED ORAL DAILY
COMMUNITY
Start: 2021-06-29

## 2021-09-27 NOTE — PROGRESS NOTES
[facility]  [address], [address]  [place] [address]  Phone: [phone]  Fax: [phone]        Liat Hackett  : 1950  PCP: Victoria Farias MD    PROGRESS NOTE      ASSESSMENT AND PLAN    Diagnoses and all orders for this visit:    1. Primary osteoarthritis of both hips  -     REFERRAL TO ORTHOPEDICS    2. Spondylolisthesis at L4-L5 level        1. Yossi Wilburn is a 66 y.o. female w/RA presenting with worsening mechanical groin pain. No sciatica. 2. DC Cymbalta 60mg due to H/A.  3. Discussed trial 40mg (20mg BID), pt concerned about SE, will hold off. Cont OTC meds. 4. Referral to Dr. Bernardo Monson for hip evaluation    Follow-up and Dispositions    · Return for fu with Dr. Bernardo Monson. HISTORY OF PRESENT ILLNESS      Brea Hardwick is a 66 y.o. female presents for follow up of left leg pain. LV given a MDP and increased Cymbalta to 60mg. She reports that her pain has worsened. The pain radiates to into her anterior thigh and is exacerbated with prolonged sitting and standing. Denies numbness and tingling. She cannot sleep on her left side due to leg pain. Patient ambulates with a walker at home. She was on a MDP with no benefit. Benefit w/Cymbalta 60mg. She had to discontinue Cymbalta 60 mg due to headaches. It took a month for her headaches to subside. She also takes Ibuprofen with no benefit.  No H/A but no benefit w/30mg Cymbalta    Pain Assessment  2021   Location of Pain Leg;Hip   Location Modifiers Left   Severity of Pain 8   Quality of Pain Other (Comment)   Quality of Pain Comment pt unableo describe   Duration of Pain Persistent   Frequency of Pain Constant   Aggravating Factors Other (Comment)   Aggravating Factors Comment lying down, sit too long   Limiting Behavior Yes   Relieving Factors Nothing   Relieving Factors Comment -   Result of Injury -         Pertinent past spine history:  L MRI 2021 grade I listhesis w/severe synovitis. L XR: listhesis I-II L4/5, listhesis I L3/4, motion noted  2012 xray : mod DJD b/l hips  Beneficial meds: Prednisone  Failed meds:  Skelaxin, Cymbalta 60 mg, helped but hadheadaches, Cymbalta 30 mg - no benefit, Ibuprofen, MDP  Spine injections: none  Spine sx: no        Pertinent past medical history:   CAD status post CABG, diabetes, reflux, HTN, rheumatoid arthritiss (Dr. Liam Wilkins), Previously on chronic steroids w/weight gain and DM. Fully vax COVID (spring 2021)    PHYSICAL EXAMINATION    Visit Vitals  Pulse 63   Temp 97.1 °F (36.2 °C) (Skin)   Ht 5' 7\" (1.702 m)   Wt 283 lb (128.4 kg)   LMP 09/15/2013   SpO2 100% Comment: RA   BMI 44.32 kg/m²       TTP L4-5, left trochanteric bursa  Groin pain wit left hip ROM  SLR negative   Ambulating with a limp        Ms. Adrienne Jefferson may have a reminder for a \"due or due soon\" health maintenance. I have asked that she contact her primary care provider for follow-up on this health maintenance. This note was created using Dragon transcription software, unintended errors may be present. Written by Bhupinder Wan, as dictated by Maura Vicente MD.  I, Dr. Maura Vicente MD, confirm that all documentation is accurate.

## 2021-09-27 NOTE — PROGRESS NOTES
Tomasa Dyer presents today for   Chief Complaint   Patient presents with    Leg Pain     left       Is someone accompanying this pt? no    Is the patient using any DME equipment during OV? no    Depression Screening:  3 most recent PHQ Screens 5/6/2021   Little interest or pleasure in doing things Not at all   Feeling down, depressed, irritable, or hopeless Not at all   Total Score PHQ 2 0       Abuse Screening:  Abuse Screening Questionnaire 5/6/2021   Do you ever feel afraid of your partner? N   Are you in a relationship with someone who physically or mentally threatens you? N   Is it safe for you to go home? Y       Fall Risk  Fall Risk Assessment, last 12 mths 5/6/2021   Able to walk? Yes   Fall in past 12 months? 0   Do you feel unsteady? 0   Are you worried about falling 0       Coordination of Care:  1. Have you been to the ER, urgent care clinic since your last visit? Yes, pt went to the ER for chest pain. Notes in care everywhere. Hospitalized since your last visit? no    2. Have you seen or consulted any other health care providers outside of the [address] [place] [address] since your last visit? Yes, pcp, rheumatology Include any pap smears or colon screening.  no

## 2021-09-27 NOTE — LETTER
9/27/2021    Patient: Yue Cosby   YOB: 1950   Date of Visit: 9/27/2021     Arnaud Chavez MD  [address] [facility] [address] [place] [address]  Via Fax: [phone]    Dear Arnaud Chavez MD,      Thank you for referring Ms. Brea Montilla to [address] [facility] for evaluation. My notes for this consultation are attached. If you have questions, please do not hesitate to call me. I look forward to following your patient along with you.       Sincerely,    Shamar Tinsley MD

## 2021-10-29 ENCOUNTER — OFFICE VISIT (OUTPATIENT)
Dept: ORTHOPEDIC SURGERY | Age: 71
End: 2021-10-29
Payer: MEDICARE

## 2021-10-29 VITALS — RESPIRATION RATE: 15 BRPM | HEIGHT: 67 IN | BODY MASS INDEX: 44.57 KG/M2 | TEMPERATURE: 97.3 F | WEIGHT: 284 LBS

## 2021-10-29 DIAGNOSIS — M25.552 LEFT HIP PAIN: ICD-10-CM

## 2021-10-29 DIAGNOSIS — R63.5 WEIGHT GAIN, ABNORMAL: ICD-10-CM

## 2021-10-29 DIAGNOSIS — M25.552 HIP PAIN, BILATERAL: Primary | ICD-10-CM

## 2021-10-29 DIAGNOSIS — M16.0 BILATERAL PRIMARY OSTEOARTHRITIS OF HIP: ICD-10-CM

## 2021-10-29 DIAGNOSIS — M25.551 HIP PAIN, BILATERAL: Primary | ICD-10-CM

## 2021-10-29 PROBLEM — M05.79 RHEUMATOID ARTHRITIS INVOLVING MULTIPLE SITES WITH POSITIVE RHEUMATOID FACTOR (HCC): Status: ACTIVE | Noted: 2021-10-29

## 2021-10-29 PROCEDURE — 3017F COLORECTAL CA SCREEN DOC REV: CPT | Performed by: ORTHOPAEDIC SURGERY

## 2021-10-29 PROCEDURE — 1101F PT FALLS ASSESS-DOCD LE1/YR: CPT | Performed by: ORTHOPAEDIC SURGERY

## 2021-10-29 PROCEDURE — G8536 NO DOC ELDER MAL SCRN: HCPCS | Performed by: ORTHOPAEDIC SURGERY

## 2021-10-29 PROCEDURE — 1090F PRES/ABSN URINE INCON ASSESS: CPT | Performed by: ORTHOPAEDIC SURGERY

## 2021-10-29 PROCEDURE — G8400 PT W/DXA NO RESULTS DOC: HCPCS | Performed by: ORTHOPAEDIC SURGERY

## 2021-10-29 PROCEDURE — G8417 CALC BMI ABV UP PARAM F/U: HCPCS | Performed by: ORTHOPAEDIC SURGERY

## 2021-10-29 PROCEDURE — 72170 X-RAY EXAM OF PELVIS: CPT | Performed by: ORTHOPAEDIC SURGERY

## 2021-10-29 PROCEDURE — G8427 DOCREV CUR MEDS BY ELIG CLIN: HCPCS | Performed by: ORTHOPAEDIC SURGERY

## 2021-10-29 PROCEDURE — 99214 OFFICE O/P EST MOD 30 MIN: CPT | Performed by: ORTHOPAEDIC SURGERY

## 2021-10-29 PROCEDURE — G8432 DEP SCR NOT DOC, RNG: HCPCS | Performed by: ORTHOPAEDIC SURGERY

## 2021-10-29 NOTE — PROGRESS NOTES
Patient: Helena Carvajal                MRN: [id number]       SSN: [id number]  YOB: 1950        AGE: 66 y.o. SEX: female  Body mass index is 44.48 kg/m². PCP: Chris Ibarra MD  10/29/21    Sonia Oconnell presents today with left-sided hip pain she has known rheumatoid arthritis and sees Dr. Sheryl Charles she has been on prednisone previously and the hips been really bad for the last year and a half or so she is a tough lady and she does have a cane but she does not like to use at night pain is a feature to putting shoes and socks on is difficult and the left hip is much worse than the right one. Occasionally some back problems and she is seen at the spine center in the past    She is a very nice lady and likes to speak her mind. The examination today she walks with a distinctly antalgic gait owing to the left hip she is fairly happy her body mass index is at 44-1/2 and the right hip rotates well the left hip is fairly stiff and both feet warm and well-perfused. She does have about 1+ pitting edema and I have asked her to revisit with her cardiologist she has had open heart surgery for possible further diuretic    Mild evidence of neuropathy distally calf nontender Homans' sign negative and she is really quite unpleasant character.     Review of x-rays today 10/29/2021 AP pelvis AP and lateral of the left hip confirms end-stage arthritis involving the left hip I am concerned that she may have some avascular necrosis as well in the slightly erosive pattern therefore I recommend a an MRI evaluation she is had an MRI of her back but would like her to have one of the hip as well    Otherwise a discussion regarding surgery I think she is heading towards a hip replacement procedure I have explained that she will require to lose some weight prior to surgery which has been difficult for her which I understand will get some basic thyroid studies on her as well if not absolute pleasure to share in her care when she returns we will review the MRI of the left hip as well as some thyroid studies thank you    REVIEW OF SYSTEMS:      CON: negative  EYE: negative   ENT: negative  RESP: negative  GI:    negative   :  negative  MSK: Positive  A twelve point review of systems was completed, positives noted and all other systems were reviewed and are negative          Past Medical History:   Diagnosis Date    CAD (coronary artery disease) 2003    Cabg x2    [facility]     GERD (gastroesophageal reflux disease)     H/O seasonal allergies     Hypercholesteremia     Hypertension     Positive PPD, treated     cleared by physician 2.5 years ago.  Rheumatoid arthritis ([facility]     Dr. Freya Mario       History reviewed. No pertinent family history. Current Outpatient Medications   Medication Sig Dispense Refill    promethazine-dextromethorphan (PROMETHAZINE-DM) 6.25-15 mg/5 mL syrup Take 2.5 mL by mouth every four (4) hours as needed.  valsartan-hydroCHLOROthiazide (DIOVAN-HCT) 320-12.5 mg per tablet Take 1 Tablet by mouth daily.  atenoloL (TENORMIN) 100 mg tablet Take 100 mg by mouth daily.  albuterol (PROVENTIL HFA, VENTOLIN HFA, PROAIR HFA) 90 mcg/actuation inhaler Take 2 Puffs by inhalation every six (6) hours as needed.  fluticasone propionate (FLONASE) 50 mcg/actuation nasal spray 2 Sprays by Both Nostrils route daily as needed.  hydrOXYchloroQUINE (PLAQUENIL) 200 mg tablet Take 200 mg by mouth two (2) times a day.  metaxalone (SKELAXIN) 800 mg tablet Take 800 mg by mouth three (3) times daily as needed.       metoprolol succinate (TOPROL-XL) 50 mg XL tablet take 1 tablet by mouth once daily      BD Ultra-Fine Short Pen Needle 31 gauge x 5/16\" ndle use 1 PEN NEEDLE to inject MEDICATION subcutaneously twice a day      Insulin Needles, Disposable, 31 gauge x 3/16\" ndle USE 1 PEN NEEDLE TWICE A DAY TO INJECT INSULIN AS DIRECTED      Insulin Syringe-Needle U-100 0.5 mL 31 gauge x 5/16\" syrg by Misc. (Non-Drug; Combo Route) route.  rosuvastatin (CRESTOR) 20 mg tablet Take 20 mg by mouth nightly.  insulin lispro protamine/insulin lispro (HUMALOG MIX 75/25) 100 unit/mL (75-25) injection by SubCUTAneous route. 30 units in the morning, 25 units in the evening      ibuprofen (MOTRIN) 800 mg tablet Take 800 mg by mouth every six (6) hours as needed for Pain.  aspirin 81 mg tablet Take 81 mg by mouth daily. Allergies   Allergen Reactions    Cymbalta [Duloxetine] Other (comments)     Headache at 60mg dose  No benefit w/30mg    Penicillins Hives    Sulfur Hives       Past Surgical History:   Procedure Laterality Date    COLONOSCOPY N/A 7/12/2016    COL[facility]COPY [facility]    HX BREAST LUMPECTOMY Right 6/2014    benign    HX CORONARY ARTERY BYPASS GRAFT  2003    cabg x2    HX CYST REMOVAL   age 21?    right    HX DILATION AND CURETTAGE      x2    HX TUBAL LIGATION      GA ABDOMEN SURGERY [address] UNLISTED  11/2014    Gastric Sleeve       Social History     Socioeconomic History    Marital status: SINGLE     Spouse name: Not on file    Number of children: Not on file    Years of education: Not on file    Highest education level: Not on file   Occupational History    Not on file   Tobacco Use    Smoking status: Never Smoker    Smokeless tobacco: Never Used   Substance and Sexual Activity    Alcohol use: No    Drug use: No    Sexual activity: Not on file   Other Topics Concern    Not on file   Social History Narrative    Not on file     Social Determinants of Health     Financial Resource Strain:     Difficulty of Paying Living Expenses:    Food Insecurity:     Worried About Running Out of Food in the Last Year:     Dylon of Food in the Last Year:    Transportation Needs:     Lack of Transportation (Medical):      Lack of Transportation (Non-Medical):    Physical Activity:     Days of Exercise per Week:     Minutes of Exercise per Session:    Stress:     Feeling of Stress :    Social Connections:     Frequency of Communication with Friends and Family:     Frequency of Social Gatherings with Friends and Family:     Attends Mandaeism Services:     Active Member of Clubs or Organizations:     Attends Club or Organization Meetings:     Marital Status:    Intimate Partner Violence:     Fear of Current or Ex-Partner:     Emotionally Abused:     Physically Abused:     Sexually Abused:        Visit Vitals  Temp 97.3 °F (36.3 °C)   Resp 15   Ht 5' 7\" (1.702 m)   Wt 128.8 kg (284 lb)   LMP 09/15/2013   BMI 44.48 kg/m²         PHYSICAL EXAMINATION:  GENERAL: Alert and oriented x3, in no acute distress, well-developed, well-nourished, afebrile. HEART: No JVD. EYES: No scleral icterus   NECK: No significant lymphadenopathy   LUNGS: No respiratory compromise or indrawing  ABDOMEN: Soft, non-tender, non-distended. Note: This note was completed using voice recognition software. Any typographical/name errors or mistakes are unintentional.    Electronically signed by:  El Lux MD

## 2021-11-13 ENCOUNTER — HOSPITAL ENCOUNTER (OUTPATIENT)
Age: 71
Discharge: HOME | End: 2021-11-13
Attending: ORTHOPAEDIC SURGERY
Payer: MEDICARE

## 2021-11-13 DIAGNOSIS — M25.552 LEFT HIP PAIN: ICD-10-CM

## 2021-11-13 PROCEDURE — 73721 MRI JNT OF LWR EXTRE W/O DYE: CPT

## 2022-03-18 PROBLEM — E66.01 OBESITY, MORBID (HCC): Status: ACTIVE | Noted: 2018-11-08

## 2022-03-18 PROBLEM — M05.79 RHEUMATOID ARTHRITIS INVOLVING MULTIPLE SITES WITH POSITIVE RHEUMATOID FACTOR (HCC): Status: ACTIVE | Noted: 2021-10-29

## 2023-02-16 NOTE — PROGRESS NOTES
Patient: Rebecca Hdz                MRN: [id number]       SSN: [id number]  YOB: 1950        AGE: 67 y.o. SEX: female      PCP: Emily Mcdaniel MD  02/17/23    Chief Complaint   Patient presents with    Hip Pain     Left hip pain         HISTORY:  Rebecca Hdz is a 67 y.o. female who is seen for left hip and knee pain. She does not recall any fall or other injury to the area. She states that she was diagnosed with arthritis at age 14. She was seen by Dr. Andrew Melissa 10/29/21 and possible hip replacement was discussed but never scheduled. She has numerous medical risk factors including diabetes rheumatoid arthritis and morbid obesity. Occupation, etc: Rebecca Hdz is now retired. 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 She lives in [facility] by herself. She has 1 daughter and 1 son, but her son passed ago years ago. She has 3 grandchildren and 2 great grandchildren. She is a diabetic and takes the insulin pin. Her most recent A1C is 6.5. She underwent unsuccessful gastric sleeve procedure in 2015 @ [facility]. She states that she gained 30 lbs recently due to her not being able to walk around. She is accompanied to today's visit by her daughter. Wt Readings from Last 3 Encounters:   02/17/23 293 lb 12.8 oz (133.3 kg)   10/29/21 284 lb (128.8 kg)   09/27/21 283 lb (128.4 kg)      Body mass index is 46.02 kg/m².     Patient Active Problem List   Diagnosis    Rheumatoid arthritis involving multiple sites with positive rheumatoid factor (HCC)    Obesity, morbid (HCC)    Esophageal reflux    Other dysphagia    Vitamin D deficiency    Type 1 diabetes mellitus with hypercholesterolemia ([address]    Hypertension associated with diabetes ([address]       Social History     Tobacco Use    Smoking status: Never    Smokeless tobacco: Never   Substance Use Topics    Alcohol use: No    Drug use: No        Allergies   Allergen Reactions    Duloxetine Other (See Comments)     Headache at 60mg dose  No benefit w/30mg    Penicillins Hives    Sulfa Antibiotics Hives     Other reaction(s): rash/itching    Sulfur Hives        Current Outpatient Medications   Medication Sig    albuterol sulfate HFA (PROVENTIL;VENTOLIN;PROAIR) 108 (90 Base) MCG/ACT inhaler Inhale 2 puffs into the lungs every 6 hours as needed    atenolol (TENORMIN) 100 MG tablet Take 100 mg by mouth daily    fluticasone (FLONASE) 50 MCG/ACT nasal spray 2 sprays by Nasal route daily as needed    hydroxychloroquine (PLAQUENIL) 200 MG tablet Take 200 mg by mouth 2 times daily    ibuprofen (ADVIL;MOTRIN) 800 MG tablet Take 800 mg by mouth every 6 hours as needed    insulin lispro protamine & lispro (HUMALOG MIX) (75-25) 100 UNIT per ML SUSP injection vial Inject into the skin    metaxalone (SKELAXIN) 800 MG tablet Take 800 mg by mouth 3 times daily as needed    metoprolol succinate (TOPROL XL) 50 MG extended release tablet take 1 tablet by mouth once daily    promethazine-dextromethorphan (PROMETHAZINE-DM) 6.25-15 MG/5ML syrup Take 2.5 mLs by mouth every 4 hours as needed    rosuvastatin (CRESTOR) 20 MG tablet Take 20 mg by mouth    valsartan-hydroCHLOROthiazide (DIOVAN-HCT) 320-12.5 MG per tablet Take 1 tablet by mouth daily     No current facility-administered medications for this visit.         PHYSICAL EXAMINATION:  Pulse 68   Temp 98.4 °F (36.9 °C) (Temporal)   Ht 5' 7\" (1.702 m)   Wt 293 lb 12.8 oz (133.3 kg)   LMP  (LMP Unknown)   SpO2 95%   BMI 46.02 kg/m²      ORTHO EXAMINATION:  Examination Right hip Left hip   Skin Intact Intact   External Rotation ROM 10 10   Internal Rotation ROM 10 10   Trochanteric tenderness + +   Hip flexion contracture - +   Antalgic gait + +   Trendelenberg sign - +   Lumbar tenderness - -   Straight leg raise - -   Calf tenderness - -   Neurovascular Intact Intact        PROCEDURE:     Chart reviewed for the following:   Delvis Lawrence MD, have reviewed the History, Physical and updated the Allergic reactions for [address] performed immediately prior to start of procedure:  I, Dieter Najera MD, have performed the following reviews on Dawna Saravia prior to the start of the procedure:            * Patient was identified by name and date of birth   * Agreement on procedure being performed was verified  * Risks and Benefits explained to the patient  * Procedure site verified and marked as necessary  * Patient was positioned for comfort  * Consent was obtained  Time: 10:44 AM  Date of procedure: 2/17/2023    Procedure performed by:  Dr. Austin Fearing    Ms. Michelle Saldivar tolerated the procedure well with no complications. RADIOGRAPHS:  AP pelvis and two views of left hip: no fractures, severe joint space narrowing, + osteophytes present. .  I independently reviewed these images today. MRI LEFT HIP 11/13/21  Impression   :       1. Advanced arthritis of the left hip as outlined above. -No fracture or AVN       2. Lesser arthritis of the right hip       3. Small uterine fibroids     MRI LUMBAR 5/26/21  There is degenerative subluxation L5 on S1 secondary to severe facet arthropathy   which has progressed slightly since 2012         IMPRESSION:    Encounter Diagnoses   Name Primary? Primary osteoarthritis of right hip Yes    Chronic pain of both hips     Peripheral edema     Type 1 diabetes mellitus without complication (HCC)     Trochanteric bursitis of left hip     Arthritis of back     At maximum risk for fall     Rheumatoid arthritis involving left hip with positive rheumatoid factor (HCC)         PLAN:  After discussing treatment options, patient's left hip was injected with 4 cc Marcaine and 1/2 cc Celestone. Weight loss. She will follow-up as needed with Dr. Riley Rob.     Dieter Najera MD

## 2023-02-17 ENCOUNTER — OFFICE VISIT (OUTPATIENT)
Age: 73
End: 2023-02-17

## 2023-02-17 VITALS
TEMPERATURE: 98.4 F | HEART RATE: 68 BPM | HEIGHT: 67 IN | BODY MASS INDEX: 45.99 KG/M2 | WEIGHT: 293 LBS | OXYGEN SATURATION: 95 %

## 2023-02-17 DIAGNOSIS — M70.62 TROCHANTERIC BURSITIS OF LEFT HIP: ICD-10-CM

## 2023-02-17 DIAGNOSIS — M47.9 ARTHRITIS OF BACK: ICD-10-CM

## 2023-02-17 DIAGNOSIS — M25.552 CHRONIC PAIN OF BOTH HIPS: ICD-10-CM

## 2023-02-17 DIAGNOSIS — G89.29 CHRONIC PAIN OF BOTH HIPS: ICD-10-CM

## 2023-02-17 DIAGNOSIS — M05.752 RHEUMATOID ARTHRITIS INVOLVING LEFT HIP WITH POSITIVE RHEUMATOID FACTOR (HCC): ICD-10-CM

## 2023-02-17 DIAGNOSIS — M16.11 PRIMARY OSTEOARTHRITIS OF RIGHT HIP: Primary | ICD-10-CM

## 2023-02-17 DIAGNOSIS — M25.551 CHRONIC PAIN OF BOTH HIPS: ICD-10-CM

## 2023-02-17 DIAGNOSIS — R60.9 PERIPHERAL EDEMA: ICD-10-CM

## 2023-02-17 DIAGNOSIS — Z91.81 AT MAXIMUM RISK FOR FALL: ICD-10-CM

## 2023-02-17 DIAGNOSIS — E10.9 TYPE 1 DIABETES MELLITUS WITHOUT COMPLICATION (HCC): ICD-10-CM

## 2023-02-17 PROBLEM — E10.69 TYPE 1 DIABETES MELLITUS WITH HYPERCHOLESTEROLEMIA (HCC): Status: ACTIVE | Noted: 2018-06-05

## 2023-02-17 PROBLEM — E78.00 TYPE 1 DIABETES MELLITUS WITH HYPERCHOLESTEROLEMIA (HCC): Status: ACTIVE | Noted: 2018-06-05

## 2023-02-17 PROBLEM — E55.9 VITAMIN D DEFICIENCY: Status: ACTIVE | Noted: 2018-10-05

## 2023-02-17 RX ORDER — BETAMETHASONE SODIUM PHOSPHATE AND BETAMETHASONE ACETATE 3; 3 MG/ML; MG/ML
6 INJECTION, SUSPENSION INTRA-ARTICULAR; INTRALESIONAL; INTRAMUSCULAR; SOFT TISSUE ONCE
Status: COMPLETED | OUTPATIENT
Start: 2023-02-17 | End: 2023-02-17

## 2023-02-17 RX ADMIN — BETAMETHASONE SODIUM PHOSPHATE AND BETAMETHASONE ACETATE 6 MG: 3; 3 INJECTION, SUSPENSION INTRA-ARTICULAR; INTRALESIONAL; INTRAMUSCULAR; SOFT TISSUE at 10:33

## 2023-03-24 ENCOUNTER — OFFICE VISIT (OUTPATIENT)
Age: 73
End: 2023-03-24

## 2023-03-24 VITALS — RESPIRATION RATE: 14 BRPM | BODY MASS INDEX: 45.67 KG/M2 | WEIGHT: 291 LBS | HEIGHT: 67 IN

## 2023-03-24 DIAGNOSIS — M47.817 OSTEOARTHRITIS OF LUMBOSACRAL SPINE: ICD-10-CM

## 2023-03-24 DIAGNOSIS — M16.12 PRIMARY OSTEOARTHRITIS OF LEFT HIP: ICD-10-CM

## 2023-03-24 DIAGNOSIS — M85.80 OSTEOPENIA, UNSPECIFIED LOCATION: ICD-10-CM

## 2023-03-24 DIAGNOSIS — G89.29 CHRONIC LEFT HIP PAIN: Primary | ICD-10-CM

## 2023-03-24 DIAGNOSIS — M25.552 CHRONIC LEFT HIP PAIN: Primary | ICD-10-CM

## 2023-03-24 NOTE — PROGRESS NOTES
Patient: Barrington Meehan                MRN: [id number]       SSN:   YOB: 1950        AGE: 67 y.o. SEX: female  There is no height or weight on file to calculate BMI. PCP: Clara Albert MD  03/24/23    Mrs. Beto Girard returns with her daughter for reevaluation of left-sided hip pain she is known rheumatoid arthritis patient with diabetes has been fearful of having surgery in the past she had a gastric sleeve and she is put some weight back I think her BMI is in the high 40s currently denies fevers or chills she is getting groin and left hip pain difficulty putting shoes and socks on getting out of the car stepper stairs are becoming very difficult for her and she recently received an injection in her back from Dr. Erin Pineda which was helpful but still having problems with her left hip and the exam today very nice lady in no acute distress the right hip rotates well the left hip we will get a jog of motion at reproduces her groin discomfort calf is nontender Gar Balzarine' sign is negative just mild evidence of neuropathy distally no foot drop she is very pleasant low backs minimally tender today    X-rays do confirm as above mentioned mild protrusio severe arthritis    I would not hesitate to recommend surgery once the body mass index is at 40 she is very happy and I would like her to get a referral to bariatrics she may be a good candidate for the nonoperative program itself we will see her back in about 6 to 8 weeks time we will arrange for an intra-articular injection there was a discussion guarding surgery was decided that is not currently recommended due to body mass index and body habitus but I be very happy to reconstruct the hip likely with some bone grafting the timing is correct for her thank you              I have personally reviewed the 3-view x-rays of the left hip, obtained today, 03/24/23. Shows advanced arthritis of the left hip, osteopenia, mild protrusio deformity.

## 2023-05-05 ENCOUNTER — OFFICE VISIT (OUTPATIENT)
Age: 73
End: 2023-05-05

## 2023-05-05 VITALS — RESPIRATION RATE: 14 BRPM | WEIGHT: 293 LBS | BODY MASS INDEX: 45.99 KG/M2 | HEIGHT: 67 IN

## 2023-05-05 DIAGNOSIS — M25.551 CHRONIC HIP PAIN, RIGHT: ICD-10-CM

## 2023-05-05 DIAGNOSIS — G89.29 CHRONIC HIP PAIN, LEFT: ICD-10-CM

## 2023-05-05 DIAGNOSIS — M16.11 PRIMARY OSTEOARTHRITIS OF RIGHT HIP: ICD-10-CM

## 2023-05-05 DIAGNOSIS — M16.12 PRIMARY OSTEOARTHRITIS OF LEFT HIP: Primary | ICD-10-CM

## 2023-05-05 DIAGNOSIS — M47.817 OSTEOARTHRITIS OF LUMBOSACRAL SPINE: ICD-10-CM

## 2023-05-05 DIAGNOSIS — M24.7 PROTRUSIO ACETABULI: ICD-10-CM

## 2023-05-05 DIAGNOSIS — M70.61 TROCHANTERIC BURSITIS, RIGHT HIP: ICD-10-CM

## 2023-05-05 DIAGNOSIS — G89.29 CHRONIC PAIN OF RIGHT HIP: ICD-10-CM

## 2023-05-05 DIAGNOSIS — M85.80 OSTEOPENIA, UNSPECIFIED LOCATION: ICD-10-CM

## 2023-05-05 DIAGNOSIS — G89.29 CHRONIC HIP PAIN, RIGHT: ICD-10-CM

## 2023-05-05 DIAGNOSIS — M25.551 CHRONIC PAIN OF RIGHT HIP: ICD-10-CM

## 2023-05-05 DIAGNOSIS — M25.552 CHRONIC HIP PAIN, LEFT: ICD-10-CM

## 2023-05-05 DIAGNOSIS — M54.50 LUMBAR PAIN: ICD-10-CM

## 2023-05-05 DIAGNOSIS — M81.0 OSTEOPOROSIS, UNSPECIFIED OSTEOPOROSIS TYPE, UNSPECIFIED PATHOLOGICAL FRACTURE PRESENCE: ICD-10-CM

## 2023-05-05 RX ORDER — BETAMETHASONE SODIUM PHOSPHATE AND BETAMETHASONE ACETATE 3; 3 MG/ML; MG/ML
6 INJECTION, SUSPENSION INTRA-ARTICULAR; INTRALESIONAL; INTRAMUSCULAR; SOFT TISSUE ONCE
Status: COMPLETED | OUTPATIENT
Start: 2023-05-05 | End: 2023-05-05

## 2023-05-05 RX ORDER — MELOXICAM 15 MG/1
15 TABLET ORAL DAILY PRN
Qty: 30 TABLET | Refills: 3 | Status: SHIPPED | OUTPATIENT
Start: 2023-05-05

## 2023-05-05 RX ADMIN — BETAMETHASONE SODIUM PHOSPHATE AND BETAMETHASONE ACETATE 6 MG: 3; 3 INJECTION, SUSPENSION INTRA-ARTICULAR; INTRALESIONAL; INTRAMUSCULAR; SOFT TISSUE at 11:11

## 2023-05-05 NOTE — PROGRESS NOTES
Patient: Amber Damico                MRN: [id number]       SSN:   YOB: 1950        AGE: 67 y.o. SEX: female  Body mass index is 45.89 kg/m². PCP: Caryle Bouton, MD  05/05/23    Kristian Guevara is here for reevaluation of bilateral hip pain post to have an intra-articular injection but they never called her we will get this arranged for her also complaining of right hip pain she has known rheumatoid arthritis and severe arthritis left hip also suffers from morbid obesity with BMI at 55. She is accompanied by her daughter today    She does use a walker with wheels and a seat for longer distances and uses a cane for shorter distances the exam today very nice lady walks with an antalgic gait onto the left side and mild Trendelenburg on the right side low backs mildly tender for her left hip is quite stiff with only a jog of rotation but not nothing acute the right hip actually rotates fairly well but quite tender over the trochanter laterally. No cyanosis or clubbing there is minimal peripheral edema distally in both feet warm and well-perfused previous x-rays confirm end-stage arthritis left hip    There was a discussion regarding surgery I think we should hold off on surgery until there is some significant weight loss therefore we will try with intra-articular injection for the left hip at the hospital or bursal injection for the right hip and were going to prescribe Mobic for her with usual precautions that she does tolerate ibuprofen quite nicely    If she does not tolerate the Mobic she will be discontinuing it with usual precautions its been a pleasure to share in her care and we will see her back in a couple months time after she had the intra-articular injection of the left hip likely repeat the AP pelvis at that point thank you                I have personally reviewed the previous 3-view x-rays of the left hip, obtained 03/24/23.  Shows severe arthritis of the left hip,

## 2023-05-23 ENCOUNTER — HOSPITAL ENCOUNTER (OUTPATIENT)
Facility: HOSPITAL | Age: 73
Discharge: HOME | End: 2023-05-26
Payer: MEDICARE

## 2023-05-23 DIAGNOSIS — M81.0 OSTEOPOROSIS, UNSPECIFIED OSTEOPOROSIS TYPE, UNSPECIFIED PATHOLOGICAL FRACTURE PRESENCE: ICD-10-CM

## 2023-05-23 DIAGNOSIS — M85.80 OSTEOPENIA, UNSPECIFIED LOCATION: ICD-10-CM

## 2023-05-23 PROCEDURE — 77080 DXA BONE DENSITY AXIAL: CPT

## 2023-06-05 ENCOUNTER — HOSPITAL ENCOUNTER (OUTPATIENT)
Facility: HOSPITAL | Age: 73
Discharge: HOME | End: 2023-06-08
Payer: MEDICAID

## 2023-06-05 DIAGNOSIS — G89.29 CHRONIC HIP PAIN, LEFT: ICD-10-CM

## 2023-06-05 DIAGNOSIS — M16.12 PRIMARY OSTEOARTHRITIS OF LEFT HIP: ICD-10-CM

## 2023-06-05 DIAGNOSIS — M25.552 CHRONIC HIP PAIN, LEFT: ICD-10-CM

## 2023-06-05 PROCEDURE — 6360000002 HC RX W HCPCS: Performed by: ORTHOPAEDIC SURGERY

## 2023-06-05 PROCEDURE — 6360000004 HC RX CONTRAST MEDICATION: Performed by: ORTHOPAEDIC SURGERY

## 2023-06-05 PROCEDURE — 20610 DRAIN/INJ JOINT/BURSA W/O US: CPT

## 2023-06-05 PROCEDURE — 2500000003 HC RX 250 WO HCPCS: Performed by: ORTHOPAEDIC SURGERY

## 2023-06-05 RX ORDER — TRIAMCINOLONE ACETONIDE 40 MG/ML
40 INJECTION, SUSPENSION INTRA-ARTICULAR; INTRAMUSCULAR ONCE
Status: COMPLETED | OUTPATIENT
Start: 2023-06-05 | End: 2023-06-05

## 2023-06-05 RX ORDER — LIDOCAINE HYDROCHLORIDE 10 MG/ML
10 INJECTION, SOLUTION EPIDURAL; INFILTRATION; INTRACAUDAL; PERINEURAL ONCE
Status: COMPLETED | OUTPATIENT
Start: 2023-06-05 | End: 2023-06-05

## 2023-06-05 RX ADMIN — TRIAMCINOLONE ACETONIDE 40 MG: 40 INJECTION, SUSPENSION INTRA-ARTICULAR; INTRAMUSCULAR at 10:45

## 2023-06-05 RX ADMIN — LIDOCAINE HYDROCHLORIDE 10 ML: 10 INJECTION, SOLUTION EPIDURAL; INFILTRATION; INTRACAUDAL; PERINEURAL at 10:45

## 2023-06-05 RX ADMIN — IOPAMIDOL 10 ML: 408 INJECTION, SOLUTION INTRATHECAL at 10:45

## 2023-08-29 ENCOUNTER — OFFICE VISIT (OUTPATIENT)
Age: 73
End: 2023-08-29
Payer: MEDICAID

## 2023-08-29 VITALS — WEIGHT: 291 LBS | BODY MASS INDEX: 45.67 KG/M2 | RESPIRATION RATE: 18 BRPM | HEIGHT: 67 IN

## 2023-08-29 DIAGNOSIS — M47.817 OSTEOARTHRITIS OF LUMBOSACRAL SPINE: ICD-10-CM

## 2023-08-29 DIAGNOSIS — M16.12 PRIMARY OSTEOARTHRITIS OF LEFT HIP: Primary | ICD-10-CM

## 2023-08-29 DIAGNOSIS — M16.11 PRIMARY OSTEOARTHRITIS OF RIGHT HIP: ICD-10-CM

## 2023-08-29 DIAGNOSIS — M70.61 TROCHANTERIC BURSITIS, RIGHT HIP: ICD-10-CM

## 2023-08-29 DIAGNOSIS — M85.80 OSTEOPENIA, UNSPECIFIED LOCATION: ICD-10-CM

## 2023-08-29 PROCEDURE — 99214 OFFICE O/P EST MOD 30 MIN: CPT | Performed by: PHYSICIAN ASSISTANT

## 2023-08-29 PROCEDURE — 20611 DRAIN/INJ JOINT/BURSA W/US: CPT | Performed by: PHYSICIAN ASSISTANT

## 2023-08-29 PROCEDURE — 72170 X-RAY EXAM OF PELVIS: CPT | Performed by: PHYSICIAN ASSISTANT

## 2023-08-29 PROCEDURE — 1123F ACP DISCUSS/DSCN MKR DOCD: CPT | Performed by: PHYSICIAN ASSISTANT

## 2023-08-29 RX ORDER — BETAMETHASONE SODIUM PHOSPHATE AND BETAMETHASONE ACETATE 3; 3 MG/ML; MG/ML
6 INJECTION, SUSPENSION INTRA-ARTICULAR; INTRALESIONAL; INTRAMUSCULAR; SOFT TISSUE ONCE
Status: COMPLETED | OUTPATIENT
Start: 2023-08-29 | End: 2023-08-29

## 2023-08-29 RX ADMIN — BETAMETHASONE SODIUM PHOSPHATE AND BETAMETHASONE ACETATE 6 MG: 3; 3 INJECTION, SUSPENSION INTRA-ARTICULAR; INTRALESIONAL; INTRAMUSCULAR; SOFT TISSUE at 09:57

## 2023-09-30 DIAGNOSIS — M25.552 CHRONIC HIP PAIN, LEFT: ICD-10-CM

## 2023-09-30 DIAGNOSIS — M16.11 PRIMARY OSTEOARTHRITIS OF RIGHT HIP: ICD-10-CM

## 2023-09-30 DIAGNOSIS — G89.29 CHRONIC HIP PAIN, LEFT: ICD-10-CM

## 2023-09-30 DIAGNOSIS — G89.29 CHRONIC PAIN OF RIGHT HIP: ICD-10-CM

## 2023-09-30 DIAGNOSIS — M16.12 PRIMARY OSTEOARTHRITIS OF LEFT HIP: ICD-10-CM

## 2023-09-30 DIAGNOSIS — M25.551 CHRONIC PAIN OF RIGHT HIP: ICD-10-CM

## 2023-10-02 RX ORDER — MELOXICAM 15 MG/1
TABLET ORAL
Qty: 30 TABLET | Refills: 3 | Status: SHIPPED | OUTPATIENT
Start: 2023-10-02

## 2023-10-03 ENCOUNTER — HOSPITAL ENCOUNTER (OUTPATIENT)
Facility: HOSPITAL | Age: 73
Discharge: HOME | End: 2023-10-06
Payer: MEDICAID

## 2023-10-03 DIAGNOSIS — M16.11 PRIMARY OSTEOARTHRITIS OF RIGHT HIP: ICD-10-CM

## 2023-10-03 PROCEDURE — 6360000004 HC RX CONTRAST MEDICATION: Performed by: PHYSICIAN ASSISTANT

## 2023-10-03 PROCEDURE — 6360000002 HC RX W HCPCS: Performed by: PHYSICIAN ASSISTANT

## 2023-10-03 PROCEDURE — 20610 DRAIN/INJ JOINT/BURSA W/O US: CPT

## 2023-10-03 PROCEDURE — 2500000003 HC RX 250 WO HCPCS: Performed by: PHYSICIAN ASSISTANT

## 2023-10-03 RX ORDER — IOPAMIDOL 408 MG/ML
10 INJECTION, SOLUTION INTRATHECAL ONCE
Status: COMPLETED | OUTPATIENT
Start: 2023-10-03 | End: 2023-10-03

## 2023-10-03 RX ORDER — LIDOCAINE HYDROCHLORIDE 10 MG/ML
10 INJECTION, SOLUTION EPIDURAL; INFILTRATION; INTRACAUDAL; PERINEURAL ONCE
Status: COMPLETED | OUTPATIENT
Start: 2023-10-03 | End: 2023-10-03

## 2023-10-03 RX ORDER — TRIAMCINOLONE ACETONIDE 40 MG/ML
40 INJECTION, SUSPENSION INTRA-ARTICULAR; INTRAMUSCULAR ONCE
Status: COMPLETED | OUTPATIENT
Start: 2023-10-03 | End: 2023-10-03

## 2023-10-03 RX ADMIN — LIDOCAINE HYDROCHLORIDE 7 ML: 10 INJECTION, SOLUTION EPIDURAL; INFILTRATION; INTRACAUDAL; PERINEURAL at 11:29

## 2023-10-03 RX ADMIN — TRIAMCINOLONE ACETONIDE 40 MG: 40 INJECTION, SUSPENSION INTRA-ARTICULAR; INTRAMUSCULAR at 11:29

## 2023-10-03 RX ADMIN — IOPAMIDOL 4 ML: 408 INJECTION, SOLUTION INTRATHECAL at 11:28

## 2023-12-01 ENCOUNTER — OFFICE VISIT (OUTPATIENT)
Age: 73
End: 2023-12-01

## 2023-12-01 VITALS — HEIGHT: 67 IN | BODY MASS INDEX: 45.67 KG/M2 | WEIGHT: 291 LBS

## 2023-12-01 DIAGNOSIS — M75.52 SUBACROMIAL BURSITIS OF LEFT SHOULDER JOINT: ICD-10-CM

## 2023-12-01 DIAGNOSIS — M19.012 GLENOHUMERAL ARTHRITIS, LEFT: ICD-10-CM

## 2023-12-01 DIAGNOSIS — M16.11 PRIMARY OSTEOARTHRITIS OF RIGHT HIP: Primary | ICD-10-CM

## 2023-12-01 DIAGNOSIS — G89.29 CHRONIC HIP PAIN, LEFT: ICD-10-CM

## 2023-12-01 DIAGNOSIS — M75.82 ROTATOR CUFF TENDONITIS, LEFT: ICD-10-CM

## 2023-12-01 DIAGNOSIS — M25.552 CHRONIC HIP PAIN, LEFT: ICD-10-CM

## 2023-12-01 DIAGNOSIS — R10.32 GROIN PAIN, LEFT: ICD-10-CM

## 2023-12-01 DIAGNOSIS — M16.12 PRIMARY OSTEOARTHRITIS OF LEFT HIP: ICD-10-CM

## 2023-12-01 RX ORDER — BETAMETHASONE SODIUM PHOSPHATE AND BETAMETHASONE ACETATE 3; 3 MG/ML; MG/ML
6 INJECTION, SUSPENSION INTRA-ARTICULAR; INTRALESIONAL; INTRAMUSCULAR; SOFT TISSUE ONCE
Status: COMPLETED | OUTPATIENT
Start: 2023-12-01 | End: 2023-12-01

## 2023-12-01 RX ADMIN — BETAMETHASONE SODIUM PHOSPHATE AND BETAMETHASONE ACETATE 6 MG: 3; 3 INJECTION, SUSPENSION INTRA-ARTICULAR; INTRALESIONAL; INTRAMUSCULAR; SOFT TISSUE at 10:34

## 2023-12-01 NOTE — PROGRESS NOTES
celestone    Date of procedure: 12/01/23    Procedure performed by: Mary Jo Silva PA-C    Provider assisted by: none    Patient assisted by: self    How tolerated by patient: tolerated the procedure well with no complications    Post Procedural Pain Scale: 5    Comments:   [address] using a frequency of 10MHz with a 12L-RS transducer head was used to confirm needle placement.   Ultrasound images captured using [address] Ultrasound machine and scanned into patient's chart       Colin James PA-C, ATC

## 2023-12-26 ENCOUNTER — HOSPITAL ENCOUNTER (OUTPATIENT)
Facility: HOSPITAL | Age: 73
Discharge: HOME | End: 2023-12-29
Payer: MEDICAID

## 2023-12-26 DIAGNOSIS — M16.12 PRIMARY OSTEOARTHRITIS OF LEFT HIP: ICD-10-CM

## 2023-12-26 PROCEDURE — 2500000003 HC RX 250 WO HCPCS: Performed by: PHYSICIAN ASSISTANT

## 2023-12-26 PROCEDURE — 6360000002 HC RX W HCPCS: Performed by: PHYSICIAN ASSISTANT

## 2023-12-26 PROCEDURE — 6360000004 HC RX CONTRAST MEDICATION: Performed by: PHYSICIAN ASSISTANT

## 2023-12-26 PROCEDURE — 77002 NEEDLE LOCALIZATION BY XRAY: CPT

## 2023-12-26 PROCEDURE — 20610 DRAIN/INJ JOINT/BURSA W/O US: CPT

## 2023-12-26 RX ORDER — TRIAMCINOLONE ACETONIDE 40 MG/ML
40 INJECTION, SUSPENSION INTRA-ARTICULAR; INTRAMUSCULAR
Status: COMPLETED | OUTPATIENT
Start: 2023-12-26 | End: 2023-12-26

## 2023-12-26 RX ORDER — LIDOCAINE HYDROCHLORIDE 10 MG/ML
10 INJECTION, SOLUTION EPIDURAL; INFILTRATION; INTRACAUDAL; PERINEURAL
Status: COMPLETED | OUTPATIENT
Start: 2023-12-26 | End: 2023-12-26

## 2023-12-26 RX ORDER — IOPAMIDOL 408 MG/ML
10 INJECTION, SOLUTION INTRATHECAL
Status: COMPLETED | OUTPATIENT
Start: 2023-12-26 | End: 2023-12-26

## 2023-12-26 RX ADMIN — LIDOCAINE HYDROCHLORIDE 10 ML: 10 INJECTION, SOLUTION EPIDURAL; INFILTRATION; INTRACAUDAL; PERINEURAL at 08:41

## 2023-12-26 RX ADMIN — TRIAMCINOLONE ACETONIDE 40 MG: 40 INJECTION, SUSPENSION INTRA-ARTICULAR; INTRAMUSCULAR at 08:42

## 2023-12-26 RX ADMIN — IOPAMIDOL 10 ML: 408 INJECTION, SOLUTION INTRATHECAL at 08:41

## 2023-12-26 NOTE — PROCEDURES
Fluoroscopically Guided Left Hip Steroid Injection: 12/26/2023 8:44 AM    CLINICAL INFORMATION  Unilateral primary osteoarthritis, left hip. COMPARISON  Left hip fluoroscopically guided steroid injection dated June 5, 2023    TECHNIQUE  After discussing the risks, benefits and alternatives to the procedure, the patient signed written consent and verbally agreed to proceeding with left hip fluoroscopically guided steroid injection. The skin was prepped using standard sterile technique. Local anesthesia was provided with Lidocaine. Using fluoroscopic guidance, 22-gauge needle was introduced into the joint. Placement was confirmed with injection of approximately 2 mL of Omnipaque. This was followed by approximately 2 cc of 1% lidocaine and 40 mg of Methylprednisolone. The patient tolerated the procedure well and there are no immediate complications. IMPRESSION  Successful fluoroscopically guided left hip injection. Total fluoroscopy time: 1.2 minutes  1  image and 2 fluoroscopic images    Dictated by Alex Chen MD, [facility] Radiology Resident. Attending radiologist statement: I have personally reviewed the study and this report, and concur with the above stated findings.

## 2024-01-02 ENCOUNTER — OFFICE VISIT (OUTPATIENT)
Age: 74
End: 2024-01-02
Payer: MEDICAID

## 2024-01-02 VITALS — BODY MASS INDEX: 44.42 KG/M2 | HEIGHT: 67 IN | WEIGHT: 283 LBS | TEMPERATURE: 97 F

## 2024-01-02 DIAGNOSIS — M19.012 GLENOHUMERAL ARTHRITIS, LEFT: ICD-10-CM

## 2024-01-02 DIAGNOSIS — M16.12 PRIMARY OSTEOARTHRITIS OF LEFT HIP: Primary | ICD-10-CM

## 2024-01-02 PROCEDURE — 99214 OFFICE O/P EST MOD 30 MIN: CPT | Performed by: PHYSICIAN ASSISTANT

## 2024-01-02 PROCEDURE — 1123F ACP DISCUSS/DSCN MKR DOCD: CPT | Performed by: PHYSICIAN ASSISTANT

## 2024-01-02 NOTE — PROGRESS NOTES
Patient: Dawna GONZÁLES Jovany                MRN: [id number]       SSN: [id number]  YOB: 1950        AGE: 73 y.o.        SEX: female  Body mass index is 44.32 kg/m².    PCP: Jose Segovia MD  01/02/24      This office note has been dictated.      REVIEW OF SYSTEMS:  Constitutional: Negative for fever, chills, weight loss and malaise/fatigue.   HENT: Negative.    Eyes: Negative.    Respiratory: Negative.   Cardiovascular: Negative.   Gastrointestinal: No bowel incontinence or constipation.  Genitourinary: No bladder incontinence or saddle anesthesia.  Skin: Negative.   Neurological: Negative.    Endo/Heme/Allergies: Negative.    Psychiatric/Behavioral: Negative.  Musculoskeletal: As per HPI above.     Past Medical History:   Diagnosis Date    CAD (coronary artery disease) 2003    Cabg x2    Diabetes ([facility])     GERD (gastroesophageal reflux disease)     H/O seasonal allergies     Hypercholesteremia     Hypertension     Positive PPD, treated     cleared by physician 2.5 years ago.     Rheumatoid arthritis ([facility])     Dr. Singh         Current Outpatient Medications:     meloxicam (MOBIC) 15 MG tablet, TAKE 1 TABLET BY MOUTH WITH FOOD DAILY AS NEEDED FOR PAIN. DO NOT MIX NSAIDS, Disp: 30 tablet, Rfl: 3    albuterol sulfate HFA (PROVENTIL;VENTOLIN;PROAIR) 108 (90 Base) MCG/ACT inhaler, Inhale 2 puffs into the lungs every 6 hours as needed, Disp: , Rfl:     atenolol (TENORMIN) 100 MG tablet, Take 1 tablet by mouth daily, Disp: , Rfl:     fluticasone (FLONASE) 50 MCG/ACT nasal spray, 2 sprays by Nasal route daily as needed, Disp: , Rfl:     hydroxychloroquine (PLAQUENIL) 200 MG tablet, Take 1 tablet by mouth 2 times daily, Disp: , Rfl:     insulin lispro protamine & lispro (HUMALOG MIX) (75-25) 100 UNIT per ML SUSP injection vial, Inject into the skin, Disp: , Rfl:     metaxalone (SKELAXIN) 800 MG tablet, Take 1 tablet by mouth 3 times daily as needed, Disp: , Rfl:     metoprolol succinate (TOPROL XL)

## 2024-01-26 ENCOUNTER — HOSPITAL ENCOUNTER (OUTPATIENT)
Facility: HOSPITAL | Age: 74
End: 2024-01-26
Payer: MEDICAID

## 2024-01-26 DIAGNOSIS — M19.012 GLENOHUMERAL ARTHRITIS, LEFT: ICD-10-CM

## 2024-01-26 PROCEDURE — 73221 MRI JOINT UPR EXTREM W/O DYE: CPT

## 2024-02-23 ENCOUNTER — OFFICE VISIT (OUTPATIENT)
Age: 74
End: 2024-02-23
Payer: MEDICAID

## 2024-02-23 VITALS — WEIGHT: 283 LBS | HEIGHT: 67 IN | BODY MASS INDEX: 44.42 KG/M2

## 2024-02-23 DIAGNOSIS — M12.812 ROTATOR CUFF TEAR ARTHROPATHY OF LEFT SHOULDER: ICD-10-CM

## 2024-02-23 DIAGNOSIS — M75.52 SUBACROMIAL BURSITIS OF LEFT SHOULDER JOINT: ICD-10-CM

## 2024-02-23 DIAGNOSIS — M67.814 BICEPS TENDINOSIS OF LEFT SHOULDER: ICD-10-CM

## 2024-02-23 DIAGNOSIS — M75.82 ROTATOR CUFF TENDONITIS, LEFT: Primary | ICD-10-CM

## 2024-02-23 DIAGNOSIS — M75.102 ROTATOR CUFF TEAR ARTHROPATHY OF LEFT SHOULDER: ICD-10-CM

## 2024-02-23 PROCEDURE — 99213 OFFICE O/P EST LOW 20 MIN: CPT | Performed by: PHYSICIAN ASSISTANT

## 2024-02-23 PROCEDURE — 1123F ACP DISCUSS/DSCN MKR DOCD: CPT | Performed by: PHYSICIAN ASSISTANT

## 2024-02-23 NOTE — PROGRESS NOTES
2 months ago which really did nothing for her.  I sent her for an MRI for further evaluation.  She presents today for review.  Does continue to have pain in her shoulder.  She has trouble with overhead activities.  She has pain at night.  She has pain that radiates into her deltoid.    Patient denies recent fevers, chills, chest pain, SOB, or injuries.   No recent systemic changes noted.  A 12-point review of systems is performed today.  Pertinent positives are noted.  All other systems reviewed and otherwise are negative.    Physical exam: General: Alert and oriented x3, nad.  well-developed, well nourished.  normal affect, AF.  NC/AT, EOMI, neck supple, trachea midline, no JVD present. Breathing is non-labored.  Examination of the left shoulder the skin intact.  There is no erythema ecchymosis noted.  There are no signs for infection or cellulitis present.  She has about 80 degrees of forward flexion, 80 degrees of abduction.  Positive Bell test.  Positive empty can.  Positive speeds.  Discomfort to palpation to the bicipital groove as well as subacromial space.  Decree strength in external Tatian noted.    Review of MRI left shoulder:  IMPRESSION:  1. Worsening rotator cuff tendinosis, and slightly worse partial-thickness tear,  central interstitial tear at the supraspinatus insertion. More severe  subacromial bursitis.  2. Developing degenerative disease with loss of the posterior glenoid cartilage,  with subchondral cysts. Joint effusion. Worsening biceps tendinosis with  potential interstitial tear.  3. AC joint hypertrophy/inflammation with type  2 acromion, indenting  supraspinatus myotendinous junction.    Assessment: Left shoulder shoulder bursitis, impingement syndrome, rotator cuff pathology, bicipital tendinitis    Plan: At this point, we discussed treatment options.  The patient had a cortisone injection which did help her.  We discussed physical therapy she declines.  I refer her to Dr. Steinberg for

## 2024-02-26 DIAGNOSIS — M16.11 PRIMARY OSTEOARTHRITIS OF RIGHT HIP: ICD-10-CM

## 2024-02-26 DIAGNOSIS — M25.552 CHRONIC HIP PAIN, LEFT: ICD-10-CM

## 2024-02-26 DIAGNOSIS — M25.551 CHRONIC PAIN OF RIGHT HIP: ICD-10-CM

## 2024-02-26 DIAGNOSIS — M16.12 PRIMARY OSTEOARTHRITIS OF LEFT HIP: ICD-10-CM

## 2024-02-26 DIAGNOSIS — G89.29 CHRONIC HIP PAIN, LEFT: ICD-10-CM

## 2024-02-26 DIAGNOSIS — G89.29 CHRONIC PAIN OF RIGHT HIP: ICD-10-CM

## 2024-02-26 RX ORDER — MELOXICAM 15 MG/1
TABLET ORAL
Qty: 30 TABLET | Refills: 3 | Status: SHIPPED | OUTPATIENT
Start: 2024-02-26

## 2024-03-26 ENCOUNTER — OFFICE VISIT (OUTPATIENT)
Age: 74
End: 2024-03-26
Payer: MEDICAID

## 2024-03-26 VITALS — WEIGHT: 278 LBS | BODY MASS INDEX: 43.63 KG/M2 | RESPIRATION RATE: 16 BRPM | HEIGHT: 67 IN

## 2024-03-26 DIAGNOSIS — M19.012 PRIMARY OSTEOARTHRITIS, LEFT SHOULDER: Primary | ICD-10-CM

## 2024-03-26 DIAGNOSIS — M75.102 ROTATOR CUFF SYNDROME, LEFT: ICD-10-CM

## 2024-03-26 PROCEDURE — 20610 DRAIN/INJ JOINT/BURSA W/O US: CPT | Performed by: ORTHOPAEDIC SURGERY

## 2024-03-26 PROCEDURE — 1123F ACP DISCUSS/DSCN MKR DOCD: CPT | Performed by: ORTHOPAEDIC SURGERY

## 2024-03-26 PROCEDURE — 99214 OFFICE O/P EST MOD 30 MIN: CPT | Performed by: ORTHOPAEDIC SURGERY

## 2024-03-26 RX ORDER — TRIAMCINOLONE ACETONIDE 40 MG/ML
40 INJECTION, SUSPENSION INTRA-ARTICULAR; INTRAMUSCULAR ONCE
Status: COMPLETED | OUTPATIENT
Start: 2024-03-26 | End: 2024-03-26

## 2024-03-26 RX ORDER — LIDOCAINE HYDROCHLORIDE 10 MG/ML
3 INJECTION, SOLUTION INFILTRATION; PERINEURAL ONCE
Status: COMPLETED | OUTPATIENT
Start: 2024-03-26 | End: 2024-03-26

## 2024-03-26 RX ADMIN — TRIAMCINOLONE ACETONIDE 40 MG: 40 INJECTION, SUSPENSION INTRA-ARTICULAR; INTRAMUSCULAR at 09:56

## 2024-03-26 RX ADMIN — LIDOCAINE HYDROCHLORIDE 3 ML: 10 INJECTION, SOLUTION INFILTRATION; PERINEURAL at 09:56

## 2024-03-26 NOTE — PROGRESS NOTES
[facility] AMBULATORY OFFICE NOTE      Patient: Dawna Manzo                MRN: [id number]       SSN: [id number]  YOB: 1950        AGE: 73 y.o.        SEX: female  Body mass index is 43.54 kg/m².    PCP: Jose Segovia MD  03/26/24    CHIEF COMPLAINT: Left shoulder pain    HPI: Dwana Manzo is a 73 y.o. female patient who complains of several years of left shoulder pain that has recently gotten worse over the past few months.  She has pain with range of motion.  Pain with raising the arm.  She has had a subacromial injection which she said did not give her any relief.  She had x-rays and an MRI.    Past Medical History:   Diagnosis Date    CAD (coronary artery disease) 2003    Cabg x2    Diabetes (HCC)     GERD (gastroesophageal reflux disease)     H/O seasonal allergies     Hypercholesteremia     Hypertension     Positive PPD, treated     cleared by physician 2.5 years ago.     Rheumatoid arthritis (HCC)     Dr. Singh       History reviewed. No pertinent family history.    Current Outpatient Medications   Medication Sig Dispense Refill    meloxicam (MOBIC) 15 MG tablet TAKE 1 TABLET BY MOUTH DAILY WITH FOOD AS NEEDED FOR PAIN. DO NOT MIX NSAIDS 30 tablet 3    albuterol sulfate HFA (PROVENTIL;VENTOLIN;PROAIR) 108 (90 Base) MCG/ACT inhaler Inhale 2 puffs into the lungs every 6 hours as needed      atenolol (TENORMIN) 100 MG tablet Take 1 tablet by mouth daily      fluticasone (FLONASE) 50 MCG/ACT nasal spray 2 sprays by Nasal route daily as needed      hydroxychloroquine (PLAQUENIL) 200 MG tablet Take 1 tablet by mouth 2 times daily      insulin lispro protamine & lispro (HUMALOG MIX) (75-25) 100 UNIT per ML SUSP injection vial Inject into the skin      metaxalone (SKELAXIN) 800 MG tablet Take 1 tablet by mouth 3 times daily as needed      metoprolol succinate (TOPROL XL) 50 MG extended release tablet take 1 tablet by mouth once daily

## 2024-06-18 NOTE — PROGRESS NOTES
Samy Kumar presents today for   Chief Complaint   Patient presents with    Back Pain       Is someone accompanying this pt? no    Is the patient using any DME equipment during OV? no    Depression Screening:  3 most recent PHQ Screens 5/6/2021   Little interest or pleasure in doing things Not at all   Feeling down, depressed, irritable, or hopeless Not at all   Total Score PHQ 2 0       Abuse Screening:  Abuse Screening Questionnaire 5/6/2021   Do you ever feel afraid of your partner? N   Are you in a relationship with someone who physically or mentally threatens you? N   Is it safe for you to go home? Y       Fall Risk  Fall Risk Assessment, last 12 mths 5/6/2021   Able to walk? Yes   Fall in past 12 months? 0   Do you feel unsteady? 0   Are you worried about falling 0       Coordination of Care:  1. Have you been to the ER, urgent care clinic since your last visit? no  Hospitalized since your last visit? no    2. Have you seen or consulted any other health care providers outside of the [address] [place] [address] since your last visit? no Include any pap smears or colon screening.  no s

## 2024-07-25 DIAGNOSIS — G89.29 CHRONIC HIP PAIN, LEFT: ICD-10-CM

## 2024-07-25 DIAGNOSIS — M16.12 PRIMARY OSTEOARTHRITIS OF LEFT HIP: ICD-10-CM

## 2024-07-25 DIAGNOSIS — M16.11 PRIMARY OSTEOARTHRITIS OF RIGHT HIP: ICD-10-CM

## 2024-07-25 DIAGNOSIS — M25.551 CHRONIC PAIN OF RIGHT HIP: ICD-10-CM

## 2024-07-25 DIAGNOSIS — G89.29 CHRONIC PAIN OF RIGHT HIP: ICD-10-CM

## 2024-07-25 DIAGNOSIS — M25.552 CHRONIC HIP PAIN, LEFT: ICD-10-CM

## 2024-07-25 RX ORDER — MELOXICAM 15 MG/1
TABLET ORAL
Qty: 30 TABLET | Refills: 3 | Status: SHIPPED | OUTPATIENT
Start: 2024-07-25

## 2024-11-13 ENCOUNTER — OFFICE VISIT (OUTPATIENT)
Age: 74
End: 2024-11-13

## 2024-11-13 VITALS — HEIGHT: 67 IN | WEIGHT: 260 LBS | BODY MASS INDEX: 40.81 KG/M2

## 2024-11-13 DIAGNOSIS — M16.11 PRIMARY OSTEOARTHRITIS OF RIGHT HIP: Primary | ICD-10-CM

## 2024-11-13 DIAGNOSIS — M19.012 PRIMARY OSTEOARTHRITIS, LEFT SHOULDER: ICD-10-CM

## 2024-11-13 DIAGNOSIS — M16.12 PRIMARY OSTEOARTHRITIS OF LEFT HIP: ICD-10-CM

## 2024-11-13 RX ORDER — BETAMETHASONE SODIUM PHOSPHATE AND BETAMETHASONE ACETATE 3; 3 MG/ML; MG/ML
6 INJECTION, SUSPENSION INTRA-ARTICULAR; INTRALESIONAL; INTRAMUSCULAR; SOFT TISSUE ONCE
Status: COMPLETED | OUTPATIENT
Start: 2024-11-13 | End: 2024-11-13

## 2024-11-13 RX ORDER — LIDOCAINE HYDROCHLORIDE 10 MG/ML
3 INJECTION, SOLUTION INFILTRATION; PERINEURAL ONCE
Status: COMPLETED | OUTPATIENT
Start: 2024-11-13 | End: 2024-11-13

## 2024-11-13 RX ADMIN — LIDOCAINE HYDROCHLORIDE 3 ML: 10 INJECTION, SOLUTION INFILTRATION; PERINEURAL at 09:44

## 2024-11-13 RX ADMIN — BETAMETHASONE SODIUM PHOSPHATE AND BETAMETHASONE ACETATE 6 MG: 3; 3 INJECTION, SUSPENSION INTRA-ARTICULAR; INTRALESIONAL; INTRAMUSCULAR; SOFT TISSUE at 09:45

## 2024-11-13 NOTE — PROGRESS NOTES
Patient: Dawna Manzo                MRN: [id number]       SSN: [id number]  YOB: 1950        AGE: 74 y.o.        SEX: female  Body mass index is 40.72 kg/m².    PCP: Jose Segovia MD  11/13/24            REVIEW OF SYSTEMS:  Constitutional: Negative for fever, chills, weight loss and malaise/fatigue.   HENT: Negative.    Eyes: Negative.    Respiratory: Negative.   Cardiovascular: Negative.   Gastrointestinal: No bowel incontinence or constipation.  Genitourinary: No bladder incontinence or saddle anesthesia.  Skin: Negative.   Neurological: Negative.    Endo/Heme/Allergies: Negative.    Psychiatric/Behavioral: Negative.  Musculoskeletal: As per HPI above.     Past Medical History:   Diagnosis Date    CAD (coronary artery disease) 2003    Cabg x2    Diabetes (HCC)     GERD (gastroesophageal reflux disease)     H/O seasonal allergies     Hypercholesteremia     Hypertension     Positive PPD, treated     cleared by physician 2.5 years ago.     Rheumatoid arthritis ([facility])     Dr. Singh         Current Outpatient Medications:     meloxicam (MOBIC) 15 MG tablet, TAKE 1 TABLET BY MOUTH DAILY WITH FOOD AS NEEDED FOR PAIN. DO NOT MIX NSAIDS, Disp: 30 tablet, Rfl: 3    albuterol sulfate HFA (PROVENTIL;VENTOLIN;PROAIR) 108 (90 Base) MCG/ACT inhaler, Inhale 2 puffs into the lungs every 6 hours as needed, Disp: , Rfl:     atenolol (TENORMIN) 100 MG tablet, Take 1 tablet by mouth daily, Disp: , Rfl:     fluticasone (FLONASE) 50 MCG/ACT nasal spray, 2 sprays by Nasal route daily as needed, Disp: , Rfl:     hydroxychloroquine (PLAQUENIL) 200 MG tablet, Take 1 tablet by mouth 2 times daily, Disp: , Rfl:     insulin lispro protamine & lispro (HUMALOG MIX) (75-25) 100 UNIT per ML SUSP injection vial, Inject into the skin, Disp: , Rfl:     metaxalone (SKELAXIN) 800 MG tablet, Take 1 tablet by mouth 3 times daily as needed, Disp: , Rfl:     metoprolol succinate (TOPROL XL) 50 MG extended release tablet, take

## 2024-11-21 ENCOUNTER — HOSPITAL ENCOUNTER (OUTPATIENT)
Facility: HOSPITAL | Age: 74
End: 2024-11-21
Payer: MEDICAID

## 2024-11-21 DIAGNOSIS — M16.12 PRIMARY OSTEOARTHRITIS OF LEFT HIP: ICD-10-CM

## 2024-11-21 DIAGNOSIS — M16.11 PRIMARY OSTEOARTHRITIS OF RIGHT HIP: ICD-10-CM

## 2024-11-21 PROCEDURE — 2500000003 HC RX 250 WO HCPCS: Performed by: PHYSICIAN ASSISTANT

## 2024-11-21 PROCEDURE — 77002 NEEDLE LOCALIZATION BY XRAY: CPT

## 2024-11-21 PROCEDURE — 6360000002 HC RX W HCPCS: Performed by: PHYSICIAN ASSISTANT

## 2024-11-21 PROCEDURE — 20610 DRAIN/INJ JOINT/BURSA W/O US: CPT

## 2024-11-21 PROCEDURE — 6360000004 HC RX CONTRAST MEDICATION: Performed by: PHYSICIAN ASSISTANT

## 2024-11-21 RX ORDER — LIDOCAINE HYDROCHLORIDE 10 MG/ML
10 INJECTION, SOLUTION EPIDURAL; INFILTRATION; INTRACAUDAL; PERINEURAL ONCE
Status: COMPLETED | OUTPATIENT
Start: 2024-11-21 | End: 2024-11-21

## 2024-11-21 RX ORDER — IOPAMIDOL 408 MG/ML
5 INJECTION, SOLUTION INTRATHECAL ONCE
Status: COMPLETED | OUTPATIENT
Start: 2024-11-21 | End: 2024-11-21

## 2024-11-21 RX ORDER — TRIAMCINOLONE ACETONIDE 40 MG/ML
40 INJECTION, SUSPENSION INTRA-ARTICULAR; INTRAMUSCULAR ONCE
Status: COMPLETED | OUTPATIENT
Start: 2024-11-21 | End: 2024-11-21

## 2024-11-21 RX ADMIN — TRIAMCINOLONE ACETONIDE 40 MG: 40 INJECTION, SUSPENSION INTRA-ARTICULAR; INTRAMUSCULAR at 09:31

## 2024-11-21 RX ADMIN — LIDOCAINE HYDROCHLORIDE 10 ML: 10 INJECTION, SOLUTION EPIDURAL; INFILTRATION; INTRACAUDAL; PERINEURAL at 09:32

## 2024-11-21 RX ADMIN — IOPAMIDOL 5 ML: 408 INJECTION, SOLUTION INTRATHECAL at 09:32

## 2024-11-21 RX ADMIN — TRIAMCINOLONE ACETONIDE 40 MG: 40 INJECTION, SUSPENSION INTRA-ARTICULAR; INTRAMUSCULAR at 09:33

## 2024-11-21 RX ADMIN — LIDOCAINE HYDROCHLORIDE 10 ML: 10 INJECTION, SOLUTION EPIDURAL; INFILTRATION; INTRACAUDAL; PERINEURAL at 09:05

## 2024-11-21 RX ADMIN — IOPAMIDOL 5 ML: 408 INJECTION, SOLUTION INTRATHECAL at 09:05

## 2025-02-23 ENCOUNTER — HOSPITAL ENCOUNTER (EMERGENCY)
Facility: HOSPITAL | Age: 75
Discharge: HOME | End: 2025-02-23
Attending: EMERGENCY MEDICINE
Payer: MEDICAID

## 2025-02-23 ENCOUNTER — APPOINTMENT (OUTPATIENT)
Facility: HOSPITAL | Age: 75
End: 2025-02-23
Payer: MEDICAID

## 2025-02-23 VITALS
WEIGHT: 265 LBS | HEIGHT: 67 IN | TEMPERATURE: 99.3 F | HEART RATE: 63 BPM | SYSTOLIC BLOOD PRESSURE: 174 MMHG | DIASTOLIC BLOOD PRESSURE: 75 MMHG | RESPIRATION RATE: 17 BRPM | BODY MASS INDEX: 41.59 KG/M2 | OXYGEN SATURATION: 99 %

## 2025-02-23 DIAGNOSIS — J06.9 UPPER RESPIRATORY TRACT INFECTION, UNSPECIFIED TYPE: Primary | ICD-10-CM

## 2025-02-23 PROCEDURE — 99283 EMERGENCY DEPT VISIT LOW MDM: CPT

## 2025-02-23 PROCEDURE — 6370000000 HC RX 637 (ALT 250 FOR IP): Performed by: EMERGENCY MEDICINE

## 2025-02-23 PROCEDURE — 71046 X-RAY EXAM CHEST 2 VIEWS: CPT

## 2025-02-23 RX ORDER — AZITHROMYCIN 250 MG/1
500 TABLET, FILM COATED ORAL
Status: COMPLETED | OUTPATIENT
Start: 2025-02-23 | End: 2025-02-23

## 2025-02-23 RX ORDER — BENZONATATE 200 MG/1
200 CAPSULE ORAL 3 TIMES DAILY PRN
Qty: 20 CAPSULE | Refills: 0 | Status: SHIPPED | OUTPATIENT
Start: 2025-02-23 | End: 2025-03-02

## 2025-02-23 RX ORDER — AZITHROMYCIN 250 MG/1
250 TABLET, FILM COATED ORAL DAILY
Qty: 4 TABLET | Refills: 0 | Status: SHIPPED | OUTPATIENT
Start: 2025-02-23 | End: 2025-02-27

## 2025-02-23 RX ADMIN — AZITHROMYCIN DIHYDRATE 500 MG: 250 TABLET ORAL at 09:44

## 2025-02-23 ASSESSMENT — LIFESTYLE VARIABLES
HOW OFTEN DO YOU HAVE A DRINK CONTAINING ALCOHOL: MONTHLY OR LESS
HOW MANY STANDARD DRINKS CONTAINING ALCOHOL DO YOU HAVE ON A TYPICAL DAY: 1 OR 2

## 2025-02-23 ASSESSMENT — PAIN DESCRIPTION - LOCATION: LOCATION: THROAT

## 2025-02-23 ASSESSMENT — PAIN - FUNCTIONAL ASSESSMENT: PAIN_FUNCTIONAL_ASSESSMENT: 0-10

## 2025-02-23 ASSESSMENT — PAIN SCALES - GENERAL: PAINLEVEL_OUTOF10: 2

## 2025-02-23 ASSESSMENT — PAIN DESCRIPTION - DESCRIPTORS: DESCRIPTORS: TENDER

## 2025-02-23 NOTE — ED PROVIDER NOTES
Mouth: No oral lesions.      Pharynx: Posterior oropharyngeal erythema (mild) present. No pharyngeal swelling or oropharyngeal exudate.   Eyes:      Conjunctiva/sclera: Conjunctivae normal.   Cardiovascular:      Rate and Rhythm: Normal rate.      Pulses: Normal pulses.   Pulmonary:      Effort: Pulmonary effort is normal.      Breath sounds: No wheezing, rhonchi or rales.   Abdominal:      General: Abdomen is flat.      Palpations: Abdomen is soft.      Tenderness: There is no abdominal tenderness.   Musculoskeletal:         General: Normal range of motion.      Cervical back: Normal range of motion.   Skin:     Coloration: Skin is not pale.   Neurological:      General: No focal deficit present.      Mental Status: She is alert.   Psychiatric:         Mood and Affect: Mood normal.         DIAGNOSTIC RESULTS     EKG: All EKG's are interpreted by the Emergency Department Physician who either signs or Co-signs this chart in the absence of a cardiologist.      RADIOLOGY:   Non-plain film images such as CT, Ultrasound and MRI are read by the radiologist. Plain radiographic images are visualized and preliminarily interpreted by the emergency physician with the below findings:      Interpretation per the Radiologist below, if available at the time of this note:    XR CHEST (2 VW)    (Results Pending)         LABS:  Labs Reviewed - No data to display    All other labs were within normal range or not returned as of this dictation.    EMERGENCY DEPARTMENT COURSE and DIFFERENTIAL DIAGNOSIS/MDM:   Vitals:    Vitals:    02/23/25 0844   BP: (!) 174/75   Pulse: 63   Resp: 17   Temp: 99.3 °F (37.4 °C)   TempSrc: Oral   SpO2: 99%   Weight: 120.2 kg (265 lb)   Height: 1.702 m (5' 7\")         Discussion:       Pt strongly declines covid/flu/strep as I strongly rec.  Says just wants cxr         FINAL IMPRESSION      1. Upper respiratory tract infection, unspecified type          DISPOSITION/PLAN   DISPOSITION        PATIENT REFERRED

## 2025-05-14 ENCOUNTER — OFFICE VISIT (OUTPATIENT)
Age: 75
End: 2025-05-14

## 2025-05-14 VITALS — BODY MASS INDEX: 41.5 KG/M2 | HEIGHT: 67 IN

## 2025-05-14 DIAGNOSIS — M19.012 PRIMARY OSTEOARTHRITIS, LEFT SHOULDER: Primary | ICD-10-CM

## 2025-05-14 DIAGNOSIS — M16.11 PRIMARY OSTEOARTHRITIS OF RIGHT HIP: ICD-10-CM

## 2025-05-14 DIAGNOSIS — M16.12 PRIMARY OSTEOARTHRITIS OF LEFT HIP: ICD-10-CM

## 2025-05-14 DIAGNOSIS — M70.61 TROCHANTERIC BURSITIS, RIGHT HIP: ICD-10-CM

## 2025-05-14 DIAGNOSIS — M75.52 SUBACROMIAL BURSITIS OF LEFT SHOULDER JOINT: ICD-10-CM

## 2025-05-14 RX ORDER — LIDOCAINE HYDROCHLORIDE 10 MG/ML
3 INJECTION, SOLUTION INFILTRATION; PERINEURAL ONCE
Status: COMPLETED | OUTPATIENT
Start: 2025-05-14 | End: 2025-05-14

## 2025-05-14 RX ORDER — DICLOFENAC EPOLAMINE 0.01 G/1
1 SYSTEM TOPICAL 2 TIMES DAILY
Qty: 30 PATCH | Refills: 1 | Status: SHIPPED | OUTPATIENT
Start: 2025-05-14

## 2025-05-14 RX ORDER — BETAMETHASONE SODIUM PHOSPHATE AND BETAMETHASONE ACETATE 3; 3 MG/ML; MG/ML
6 INJECTION, SUSPENSION INTRA-ARTICULAR; INTRALESIONAL; INTRAMUSCULAR; SOFT TISSUE ONCE
Status: COMPLETED | OUTPATIENT
Start: 2025-05-14 | End: 2025-05-14

## 2025-05-14 RX ADMIN — BETAMETHASONE SODIUM PHOSPHATE AND BETAMETHASONE ACETATE 6 MG: 3; 3 INJECTION, SUSPENSION INTRA-ARTICULAR; INTRALESIONAL; INTRAMUSCULAR; SOFT TISSUE at 10:27

## 2025-05-14 RX ADMIN — LIDOCAINE HYDROCHLORIDE 3 ML: 10 INJECTION, SOLUTION INFILTRATION; PERINEURAL at 10:27

## 2025-05-14 NOTE — PROGRESS NOTES
tablet by mouth once daily, Disp: , Rfl:     promethazine-dextromethorphan (PROMETHAZINE-DM) 6.25-15 MG/5ML syrup, Take 2.5 mLs by mouth every 4 hours as needed, Disp: , Rfl:     rosuvastatin (CRESTOR) 20 MG tablet, Take 1 tablet by mouth, Disp: , Rfl:     valsartan-hydroCHLOROthiazide (DIOVAN-HCT) 320-12.5 MG per tablet, Take 1 tablet by mouth daily, Disp: , Rfl:     Allergies   Allergen Reactions    Duloxetine Other (See Comments)     Headache at 60mg dose  No benefit w/30mg    Penicillins Hives    Sulfa Antibiotics Hives     Other reaction(s): rash/itching    Sulfur Hives       Social History     Socioeconomic History    Marital status: Single     Spouse name: Not on file    Number of children: Not on file    Years of education: Not on file    Highest education level: Not on file   Occupational History    Not on file   Tobacco Use    Smoking status: Never    Smokeless tobacco: Never   Substance and Sexual Activity    Alcohol use: No    Drug use: No    Sexual activity: Not on file   Other Topics Concern    Not on file   Social History Narrative    Not on file     Social Drivers of Health     Financial Resource Strain: Not on file   Food Insecurity: Not on file   Transportation Needs: Not on file   Physical Activity: Not on file   Stress: Not on file   Social Connections: Not on file   Intimate Partner Violence: Not on file   Housing Stability: Not on file       Past Surgical History:   Procedure Laterality Date    BREAST LUMPECTOMY Right 6/2014    benign    COLONOSCOPY N/A 7/12/2016    COLONOSCOPY performed by Toribio Guo MD at [facility] ENDOSCOPY    CORONARY ARTERY BYPASS GRAFT  2003    cabg x2    CYST REMOVAL   age 24?    right    DILATION AND CURETTAGE OF UTERUS      x2    MN UNLISTED PROCEDURE ABDOMEN PERITONEUM & OMENTUM  11/2014    Gastric Sleeve    TUBAL LIGATION       Patient seen evaluated today with complaints of left shoulder pain.  She does have a history of bursitis of her shoulder.  She had

## 2025-05-30 ENCOUNTER — HOSPITAL ENCOUNTER (OUTPATIENT)
Facility: HOSPITAL | Age: 75
End: 2025-05-30
Payer: MEDICARE

## 2025-05-30 DIAGNOSIS — M16.11 PRIMARY OSTEOARTHRITIS OF RIGHT HIP: ICD-10-CM

## 2025-05-30 DIAGNOSIS — M16.12 PRIMARY OSTEOARTHRITIS OF LEFT HIP: ICD-10-CM

## 2025-05-30 PROCEDURE — 2709999900 FL GUIDED NEEDLE PLACEMENT

## 2025-05-30 PROCEDURE — 6360000004 HC RX CONTRAST MEDICATION: Performed by: PHYSICIAN ASSISTANT

## 2025-05-30 PROCEDURE — 6360000002 HC RX W HCPCS: Performed by: PHYSICIAN ASSISTANT

## 2025-05-30 PROCEDURE — 77002 NEEDLE LOCALIZATION BY XRAY: CPT

## 2025-05-30 RX ORDER — LIDOCAINE HYDROCHLORIDE 10 MG/ML
10 INJECTION, SOLUTION EPIDURAL; INFILTRATION; INTRACAUDAL; PERINEURAL PRN
Status: DISPENSED | OUTPATIENT
Start: 2025-05-30

## 2025-05-30 RX ORDER — IOPAMIDOL 408 MG/ML
10 INJECTION, SOLUTION INTRATHECAL
Status: COMPLETED | OUTPATIENT
Start: 2025-05-30 | End: 2025-05-30

## 2025-05-30 RX ORDER — LIDOCAINE HYDROCHLORIDE 10 MG/ML
10 INJECTION, SOLUTION EPIDURAL; INFILTRATION; INTRACAUDAL; PERINEURAL ONCE
Status: COMPLETED | OUTPATIENT
Start: 2025-05-30 | End: 2025-05-30

## 2025-05-30 RX ORDER — LIDOCAINE HYDROCHLORIDE 10 MG/ML
10 INJECTION, SOLUTION EPIDURAL; INFILTRATION; INTRACAUDAL; PERINEURAL ONCE
OUTPATIENT
Start: 2025-05-30 | End: 2025-05-30

## 2025-05-30 RX ORDER — TRIAMCINOLONE ACETONIDE 40 MG/ML
40 INJECTION, SUSPENSION INTRA-ARTICULAR; INTRAMUSCULAR ONCE
Status: COMPLETED | OUTPATIENT
Start: 2025-05-30 | End: 2025-05-30

## 2025-05-30 RX ADMIN — TRIAMCINOLONE ACETONIDE 40 MG: 40 INJECTION, SUSPENSION INTRA-ARTICULAR; INTRAMUSCULAR at 09:00

## 2025-05-30 RX ADMIN — IOPAMIDOL 10 ML: 408 INJECTION, SOLUTION INTRATHECAL at 09:14

## 2025-05-30 RX ADMIN — LIDOCAINE HYDROCHLORIDE 10 ML: 10 INJECTION, SOLUTION EPIDURAL; INFILTRATION; INTRACAUDAL; PERINEURAL at 09:20

## 2025-05-30 RX ADMIN — LIDOCAINE HYDROCHLORIDE 10 ML: 10 INJECTION, SOLUTION EPIDURAL; INFILTRATION; INTRACAUDAL; PERINEURAL at 09:00

## 2025-05-30 RX ADMIN — TRIAMCINOLONE ACETONIDE 40 MG: 40 INJECTION, SUSPENSION INTRA-ARTICULAR; INTRAMUSCULAR at 09:15
